# Patient Record
Sex: MALE | Race: WHITE
[De-identification: names, ages, dates, MRNs, and addresses within clinical notes are randomized per-mention and may not be internally consistent; named-entity substitution may affect disease eponyms.]

---

## 2017-04-11 ENCOUNTER — SURGERY (OUTPATIENT)
Age: 57
End: 2017-04-11
Payer: COMMERCIAL

## 2017-04-11 ENCOUNTER — HOSPITAL ENCOUNTER (INPATIENT)
Facility: CLINIC | Age: 57
LOS: 3 days | Discharge: HOME | DRG: 339 | End: 2017-04-15
Attending: FAMILY MEDICINE | Admitting: SURGERY
Payer: COMMERCIAL

## 2017-04-11 ENCOUNTER — APPOINTMENT (OUTPATIENT)
Dept: CT IMAGING | Facility: CLINIC | Age: 57
DRG: 339 | End: 2017-04-11
Attending: FAMILY MEDICINE
Payer: COMMERCIAL

## 2017-04-11 ENCOUNTER — ANESTHESIA EVENT (OUTPATIENT)
Dept: SURGERY | Facility: CLINIC | Age: 57
DRG: 339 | End: 2017-04-11
Payer: COMMERCIAL

## 2017-04-11 ENCOUNTER — ANESTHESIA (OUTPATIENT)
Dept: SURGERY | Facility: CLINIC | Age: 57
DRG: 339 | End: 2017-04-11
Payer: COMMERCIAL

## 2017-04-11 DIAGNOSIS — L03.818 CELLULITIS OF OTHER SPECIFIED SITE: ICD-10-CM

## 2017-04-11 DIAGNOSIS — K35.30 ACUTE APPENDICITIS WITH LOCALIZED PERITONITIS: ICD-10-CM

## 2017-04-11 DIAGNOSIS — G89.18 ACUTE POST-OPERATIVE PAIN: Primary | ICD-10-CM

## 2017-04-11 LAB
ALBUMIN SERPL-MCNC: 4.1 G/DL (ref 3.4–5)
ALP SERPL-CCNC: 84 U/L (ref 40–150)
ALT SERPL W P-5'-P-CCNC: 31 U/L (ref 0–70)
AMYLASE SERPL-CCNC: 35 U/L (ref 30–110)
ANION GAP SERPL CALCULATED.3IONS-SCNC: 11 MMOL/L (ref 3–14)
AST SERPL W P-5'-P-CCNC: 8 U/L (ref 0–45)
BASOPHILS # BLD AUTO: 0 10E9/L (ref 0–0.2)
BASOPHILS NFR BLD AUTO: 0.2 %
BILIRUB SERPL-MCNC: 1.2 MG/DL (ref 0.2–1.3)
BUN SERPL-MCNC: 16 MG/DL (ref 7–30)
CALCIUM SERPL-MCNC: 9.1 MG/DL (ref 8.5–10.1)
CHLORIDE SERPL-SCNC: 100 MMOL/L (ref 94–109)
CO2 SERPL-SCNC: 25 MMOL/L (ref 20–32)
CREAT SERPL-MCNC: 0.77 MG/DL (ref 0.66–1.25)
DIFFERENTIAL METHOD BLD: ABNORMAL
EOSINOPHIL # BLD AUTO: 0 10E9/L (ref 0–0.7)
EOSINOPHIL NFR BLD AUTO: 0.1 %
ERYTHROCYTE [DISTWIDTH] IN BLOOD BY AUTOMATED COUNT: 12.6 % (ref 10–15)
GFR SERPL CREATININE-BSD FRML MDRD: ABNORMAL ML/MIN/1.7M2
GLUCOSE SERPL-MCNC: 133 MG/DL (ref 70–99)
HCT VFR BLD AUTO: 44.4 % (ref 40–53)
HGB BLD-MCNC: 15.5 G/DL (ref 13.3–17.7)
IMM GRANULOCYTES # BLD: 0.1 10E9/L (ref 0–0.4)
IMM GRANULOCYTES NFR BLD: 0.3 %
LIPASE SERPL-CCNC: 97 U/L (ref 73–393)
LYMPHOCYTES # BLD AUTO: 1.1 10E9/L (ref 0.8–5.3)
LYMPHOCYTES NFR BLD AUTO: 6.2 %
MCH RBC QN AUTO: 30.6 PG (ref 26.5–33)
MCHC RBC AUTO-ENTMCNC: 34.9 G/DL (ref 31.5–36.5)
MCV RBC AUTO: 88 FL (ref 78–100)
MONOCYTES # BLD AUTO: 1.6 10E9/L (ref 0–1.3)
MONOCYTES NFR BLD AUTO: 8.9 %
NEUTROPHILS # BLD AUTO: 14.8 10E9/L (ref 1.6–8.3)
NEUTROPHILS NFR BLD AUTO: 84.3 %
PLATELET # BLD AUTO: 360 10E9/L (ref 150–450)
POTASSIUM SERPL-SCNC: 3.9 MMOL/L (ref 3.4–5.3)
PROT SERPL-MCNC: 7.8 G/DL (ref 6.8–8.8)
RBC # BLD AUTO: 5.06 10E12/L (ref 4.4–5.9)
SODIUM SERPL-SCNC: 136 MMOL/L (ref 133–144)
WBC # BLD AUTO: 17.5 10E9/L (ref 4–11)

## 2017-04-11 PROCEDURE — 25000125 ZZHC RX 250: Performed by: SURGERY

## 2017-04-11 PROCEDURE — 36000052 ZZH SURGERY LEVEL 2 EA 15 ADDTL MIN: Performed by: SURGERY

## 2017-04-11 PROCEDURE — 25800025 ZZH RX 258: Performed by: NURSE ANESTHETIST, CERTIFIED REGISTERED

## 2017-04-11 PROCEDURE — 25000128 H RX IP 250 OP 636: Performed by: EMERGENCY MEDICINE

## 2017-04-11 PROCEDURE — 36000050 ZZH SURGERY LEVEL 2 1ST 30 MIN: Performed by: SURGERY

## 2017-04-11 PROCEDURE — 96375 TX/PRO/DX INJ NEW DRUG ADDON: CPT

## 2017-04-11 PROCEDURE — 0DJD4ZZ INSPECTION OF LOWER INTESTINAL TRACT, PERCUTANEOUS ENDOSCOPIC APPROACH: ICD-10-PCS | Performed by: SURGERY

## 2017-04-11 PROCEDURE — 99285 EMERGENCY DEPT VISIT HI MDM: CPT | Mod: 25

## 2017-04-11 PROCEDURE — 25000128 H RX IP 250 OP 636: Performed by: FAMILY MEDICINE

## 2017-04-11 PROCEDURE — 96361 HYDRATE IV INFUSION ADD-ON: CPT

## 2017-04-11 PROCEDURE — 83690 ASSAY OF LIPASE: CPT | Performed by: EMERGENCY MEDICINE

## 2017-04-11 PROCEDURE — 25500064 ZZH RX 255 OP 636: Performed by: FAMILY MEDICINE

## 2017-04-11 PROCEDURE — 88304 TISSUE EXAM BY PATHOLOGIST: CPT | Mod: 26 | Performed by: SURGERY

## 2017-04-11 PROCEDURE — 99285 EMERGENCY DEPT VISIT HI MDM: CPT | Performed by: FAMILY MEDICINE

## 2017-04-11 PROCEDURE — 80053 COMPREHEN METABOLIC PANEL: CPT | Performed by: EMERGENCY MEDICINE

## 2017-04-11 PROCEDURE — 37000009 ZZH ANESTHESIA TECHNICAL FEE, EACH ADDTL 15 MIN: Performed by: SURGERY

## 2017-04-11 PROCEDURE — 74177 CT ABD & PELVIS W/CONTRAST: CPT

## 2017-04-11 PROCEDURE — 96374 THER/PROPH/DIAG INJ IV PUSH: CPT

## 2017-04-11 PROCEDURE — 27210794 ZZH OR GENERAL SUPPLY STERILE: Performed by: SURGERY

## 2017-04-11 PROCEDURE — 37000008 ZZH ANESTHESIA TECHNICAL FEE, 1ST 30 MIN: Performed by: SURGERY

## 2017-04-11 PROCEDURE — 25000564 ZZH DESFLURANE, EA 15 MIN: Performed by: SURGERY

## 2017-04-11 PROCEDURE — 99221 1ST HOSP IP/OBS SF/LOW 40: CPT | Mod: 57 | Performed by: SURGERY

## 2017-04-11 PROCEDURE — 44950 APPENDECTOMY: CPT | Performed by: SURGERY

## 2017-04-11 PROCEDURE — 44950 APPENDECTOMY: CPT | Mod: AS | Performed by: PHYSICIAN ASSISTANT

## 2017-04-11 PROCEDURE — 27110028 ZZH OR GENERAL SUPPLY NON-STERILE: Performed by: SURGERY

## 2017-04-11 PROCEDURE — 82150 ASSAY OF AMYLASE: CPT | Performed by: EMERGENCY MEDICINE

## 2017-04-11 PROCEDURE — 25000125 ZZHC RX 250: Performed by: FAMILY MEDICINE

## 2017-04-11 PROCEDURE — 71000014 ZZH RECOVERY PHASE 1 LEVEL 2 FIRST HR: Performed by: SURGERY

## 2017-04-11 PROCEDURE — 85025 COMPLETE CBC W/AUTO DIFF WBC: CPT | Performed by: EMERGENCY MEDICINE

## 2017-04-11 PROCEDURE — 0DTJ0ZZ RESECTION OF APPENDIX, OPEN APPROACH: ICD-10-PCS | Performed by: SURGERY

## 2017-04-11 PROCEDURE — 25000125 ZZHC RX 250: Performed by: NURSE ANESTHETIST, CERTIFIED REGISTERED

## 2017-04-11 PROCEDURE — 25000128 H RX IP 250 OP 636: Performed by: NURSE ANESTHETIST, CERTIFIED REGISTERED

## 2017-04-11 PROCEDURE — 88304 TISSUE EXAM BY PATHOLOGIST: CPT | Performed by: SURGERY

## 2017-04-11 RX ORDER — GLYCOPYRROLATE 0.2 MG/ML
INJECTION, SOLUTION INTRAMUSCULAR; INTRAVENOUS PRN
Status: DISCONTINUED | OUTPATIENT
Start: 2017-04-11 | End: 2017-04-11

## 2017-04-11 RX ORDER — KETOROLAC TROMETHAMINE 30 MG/ML
30 INJECTION, SOLUTION INTRAMUSCULAR; INTRAVENOUS ONCE
Status: COMPLETED | OUTPATIENT
Start: 2017-04-11 | End: 2017-04-11

## 2017-04-11 RX ORDER — MEPERIDINE HYDROCHLORIDE 50 MG/ML
INJECTION INTRAMUSCULAR; INTRAVENOUS; SUBCUTANEOUS PRN
Status: DISCONTINUED | OUTPATIENT
Start: 2017-04-11 | End: 2017-04-11

## 2017-04-11 RX ORDER — FENTANYL CITRATE 50 UG/ML
25-50 INJECTION, SOLUTION INTRAMUSCULAR; INTRAVENOUS
Status: DISCONTINUED | OUTPATIENT
Start: 2017-04-11 | End: 2017-04-12 | Stop reason: HOSPADM

## 2017-04-11 RX ORDER — PROPOFOL 10 MG/ML
INJECTION, EMULSION INTRAVENOUS PRN
Status: DISCONTINUED | OUTPATIENT
Start: 2017-04-11 | End: 2017-04-11

## 2017-04-11 RX ORDER — AMPICILLIN AND SULBACTAM 2; 1 G/1; G/1
3 INJECTION, POWDER, FOR SOLUTION INTRAMUSCULAR; INTRAVENOUS
Status: COMPLETED | OUTPATIENT
Start: 2017-04-11 | End: 2017-04-11

## 2017-04-11 RX ORDER — CEFAZOLIN SODIUM 2 G/100ML
2 INJECTION, SOLUTION INTRAVENOUS
Status: DISCONTINUED | OUTPATIENT
Start: 2017-04-11 | End: 2017-04-11 | Stop reason: CLARIF

## 2017-04-11 RX ORDER — SODIUM CHLORIDE, SODIUM LACTATE, POTASSIUM CHLORIDE, CALCIUM CHLORIDE 600; 310; 30; 20 MG/100ML; MG/100ML; MG/100ML; MG/100ML
INJECTION, SOLUTION INTRAVENOUS CONTINUOUS
Status: DISCONTINUED | OUTPATIENT
Start: 2017-04-11 | End: 2017-04-12 | Stop reason: HOSPADM

## 2017-04-11 RX ORDER — CEFAZOLIN SODIUM 1 G/3ML
1 INJECTION, POWDER, FOR SOLUTION INTRAMUSCULAR; INTRAVENOUS SEE ADMIN INSTRUCTIONS
Status: DISCONTINUED | OUTPATIENT
Start: 2017-04-11 | End: 2017-04-11 | Stop reason: CLARIF

## 2017-04-11 RX ORDER — SODIUM CHLORIDE, SODIUM LACTATE, POTASSIUM CHLORIDE, CALCIUM CHLORIDE 600; 310; 30; 20 MG/100ML; MG/100ML; MG/100ML; MG/100ML
INJECTION, SOLUTION INTRAVENOUS CONTINUOUS PRN
Status: DISCONTINUED | OUTPATIENT
Start: 2017-04-11 | End: 2017-04-11

## 2017-04-11 RX ORDER — NALOXONE HYDROCHLORIDE 0.4 MG/ML
.1-.4 INJECTION, SOLUTION INTRAMUSCULAR; INTRAVENOUS; SUBCUTANEOUS
Status: DISCONTINUED | OUTPATIENT
Start: 2017-04-11 | End: 2017-04-12 | Stop reason: HOSPADM

## 2017-04-11 RX ORDER — LIDOCAINE HYDROCHLORIDE 10 MG/ML
INJECTION, SOLUTION EPIDURAL; INFILTRATION; INTRACAUDAL; PERINEURAL PRN
Status: DISCONTINUED | OUTPATIENT
Start: 2017-04-11 | End: 2017-04-11

## 2017-04-11 RX ORDER — DEXAMETHASONE SODIUM PHOSPHATE 4 MG/ML
INJECTION, SOLUTION INTRA-ARTICULAR; INTRALESIONAL; INTRAMUSCULAR; INTRAVENOUS; SOFT TISSUE PRN
Status: DISCONTINUED | OUTPATIENT
Start: 2017-04-11 | End: 2017-04-11

## 2017-04-11 RX ORDER — AMINOCAPROIC ACID 500 MG/1
500 TABLET ORAL DAILY
COMMUNITY

## 2017-04-11 RX ORDER — LORATADINE 10 MG/1
10 TABLET ORAL DAILY
COMMUNITY

## 2017-04-11 RX ORDER — ALPRAZOLAM 0.5 MG
0.5 TABLET ORAL EVERY 6 HOURS PRN
COMMUNITY
Start: 2017-01-10

## 2017-04-11 RX ORDER — FLUTICASONE PROPIONATE 50 MCG
1 SPRAY, SUSPENSION (ML) NASAL 2 TIMES DAILY
COMMUNITY

## 2017-04-11 RX ORDER — FENTANYL CITRATE 50 UG/ML
INJECTION, SOLUTION INTRAMUSCULAR; INTRAVENOUS PRN
Status: DISCONTINUED | OUTPATIENT
Start: 2017-04-11 | End: 2017-04-11

## 2017-04-11 RX ORDER — ONDANSETRON 2 MG/ML
INJECTION INTRAMUSCULAR; INTRAVENOUS PRN
Status: DISCONTINUED | OUTPATIENT
Start: 2017-04-11 | End: 2017-04-11

## 2017-04-11 RX ORDER — ONDANSETRON 4 MG/1
4 TABLET, ORALLY DISINTEGRATING ORAL EVERY 30 MIN PRN
Status: DISCONTINUED | OUTPATIENT
Start: 2017-04-11 | End: 2017-04-12 | Stop reason: HOSPADM

## 2017-04-11 RX ORDER — ALBUTEROL SULFATE 0.83 MG/ML
2.5 SOLUTION RESPIRATORY (INHALATION) EVERY 4 HOURS PRN
Status: DISCONTINUED | OUTPATIENT
Start: 2017-04-11 | End: 2017-04-12 | Stop reason: HOSPADM

## 2017-04-11 RX ORDER — AMPICILLIN, SULBACTAM 250; 125 MG/ML; MG/ML
3 INJECTION, POWDER, FOR SOLUTION INTRAMUSCULAR; INTRAVENOUS ONCE
Status: DISCONTINUED | OUTPATIENT
Start: 2017-04-11 | End: 2017-04-11 | Stop reason: CLARIF

## 2017-04-11 RX ORDER — ONDANSETRON 2 MG/ML
4 INJECTION INTRAMUSCULAR; INTRAVENOUS EVERY 30 MIN PRN
Status: DISCONTINUED | OUTPATIENT
Start: 2017-04-11 | End: 2017-04-12

## 2017-04-11 RX ORDER — ONDANSETRON 2 MG/ML
4 INJECTION INTRAMUSCULAR; INTRAVENOUS EVERY 30 MIN PRN
Status: DISCONTINUED | OUTPATIENT
Start: 2017-04-11 | End: 2017-04-12 | Stop reason: HOSPADM

## 2017-04-11 RX ORDER — IOPAMIDOL 755 MG/ML
100 INJECTION, SOLUTION INTRAVASCULAR ONCE
Status: COMPLETED | OUTPATIENT
Start: 2017-04-11 | End: 2017-04-11

## 2017-04-11 RX ORDER — NEOSTIGMINE METHYLSULFATE 1 MG/ML
VIAL (ML) INJECTION PRN
Status: DISCONTINUED | OUTPATIENT
Start: 2017-04-11 | End: 2017-04-11

## 2017-04-11 RX ORDER — HYDROMORPHONE HYDROCHLORIDE 1 MG/ML
.3-.5 INJECTION, SOLUTION INTRAMUSCULAR; INTRAVENOUS; SUBCUTANEOUS EVERY 10 MIN PRN
Status: DISCONTINUED | OUTPATIENT
Start: 2017-04-11 | End: 2017-04-12 | Stop reason: HOSPADM

## 2017-04-11 RX ORDER — MEPERIDINE HYDROCHLORIDE 25 MG/ML
12.5 INJECTION INTRAMUSCULAR; INTRAVENOUS; SUBCUTANEOUS
Status: DISCONTINUED | OUTPATIENT
Start: 2017-04-11 | End: 2017-04-12 | Stop reason: HOSPADM

## 2017-04-11 RX ADMIN — SODIUM CHLORIDE, POTASSIUM CHLORIDE, SODIUM LACTATE AND CALCIUM CHLORIDE: 600; 310; 30; 20 INJECTION, SOLUTION INTRAVENOUS at 22:38

## 2017-04-11 RX ADMIN — SODIUM CHLORIDE 66 ML: 9 INJECTION, SOLUTION INTRAVENOUS at 20:12

## 2017-04-11 RX ADMIN — IOPAMIDOL 100 ML: 755 INJECTION, SOLUTION INTRAVENOUS at 20:11

## 2017-04-11 RX ADMIN — ROCURONIUM BROMIDE 20 MG: 10 INJECTION INTRAVENOUS at 23:00

## 2017-04-11 RX ADMIN — ROCURONIUM BROMIDE 30 MG: 10 INJECTION INTRAVENOUS at 22:02

## 2017-04-11 RX ADMIN — FENTANYL CITRATE 150 MCG: 50 INJECTION, SOLUTION INTRAMUSCULAR; INTRAVENOUS at 22:44

## 2017-04-11 RX ADMIN — AMPICILLIN SODIUM AND SULBACTAM SODIUM 3 G: 2; 1 INJECTION, POWDER, FOR SOLUTION INTRAMUSCULAR; INTRAVENOUS at 22:05

## 2017-04-11 RX ADMIN — LIDOCAINE HYDROCHLORIDE 50 MG: 10 INJECTION, SOLUTION EPIDURAL; INFILTRATION; INTRACAUDAL; PERINEURAL at 22:02

## 2017-04-11 RX ADMIN — SODIUM CHLORIDE 1000 ML: 9 INJECTION, SOLUTION INTRAVENOUS at 19:33

## 2017-04-11 RX ADMIN — ONDANSETRON 4 MG: 2 INJECTION INTRAMUSCULAR; INTRAVENOUS at 22:31

## 2017-04-11 RX ADMIN — FENTANYL CITRATE 100 MCG: 50 INJECTION, SOLUTION INTRAMUSCULAR; INTRAVENOUS at 21:56

## 2017-04-11 RX ADMIN — FENTANYL CITRATE 150 MCG: 50 INJECTION, SOLUTION INTRAMUSCULAR; INTRAVENOUS at 22:02

## 2017-04-11 RX ADMIN — ROCURONIUM BROMIDE 20 MG: 10 INJECTION INTRAVENOUS at 22:28

## 2017-04-11 RX ADMIN — MIDAZOLAM HYDROCHLORIDE 2 MG: 1 INJECTION, SOLUTION INTRAMUSCULAR; INTRAVENOUS at 21:55

## 2017-04-11 RX ADMIN — ONDANSETRON 4 MG: 2 INJECTION INTRAMUSCULAR; INTRAVENOUS at 19:19

## 2017-04-11 RX ADMIN — KETOROLAC TROMETHAMINE 30 MG: 30 INJECTION, SOLUTION INTRAMUSCULAR at 19:33

## 2017-04-11 RX ADMIN — FENTANYL CITRATE 100 MCG: 50 INJECTION, SOLUTION INTRAMUSCULAR; INTRAVENOUS at 22:50

## 2017-04-11 RX ADMIN — PROPOFOL 200 MG: 10 INJECTION, EMULSION INTRAVENOUS at 22:02

## 2017-04-11 RX ADMIN — ROCURONIUM BROMIDE 10 MG: 10 INJECTION INTRAVENOUS at 22:01

## 2017-04-11 RX ADMIN — GLYCOPYRROLATE 0.2 MG: 0.2 INJECTION, SOLUTION INTRAMUSCULAR; INTRAVENOUS at 22:02

## 2017-04-11 RX ADMIN — SODIUM CHLORIDE, POTASSIUM CHLORIDE, SODIUM LACTATE AND CALCIUM CHLORIDE: 600; 310; 30; 20 INJECTION, SOLUTION INTRAVENOUS at 21:55

## 2017-04-11 RX ADMIN — Medication 3 MG: at 23:33

## 2017-04-11 RX ADMIN — DEXAMETHASONE SODIUM PHOSPHATE 4 MG: 4 INJECTION, SOLUTION INTRA-ARTICULAR; INTRALESIONAL; INTRAMUSCULAR; INTRAVENOUS; SOFT TISSUE at 22:31

## 2017-04-11 RX ADMIN — MEPERIDINE HYDROCHLORIDE 50 MG: 50 INJECTION, SOLUTION INTRAMUSCULAR; INTRAVENOUS; SUBCUTANEOUS at 23:07

## 2017-04-11 RX ADMIN — GLYCOPYRROLATE 0.6 MG: 0.2 INJECTION, SOLUTION INTRAMUSCULAR; INTRAVENOUS at 23:33

## 2017-04-11 NOTE — IP AVS SNAPSHOT
[facility]    [address] [address]    Phone:  [phone]    Fax:  [phone]                                       After Visit Summary   4/11/2017    Ugo Mancia    MRN: [id number]           After Visit Summary Signature Page     I have received my discharge instructions, and my questions have been answered. I have discussed any challenges I see with this plan with the nurse or doctor.    ..........................................................................................................................................  Patient/Patient Representative Signature      ..........................................................................................................................................  Patient Representative Print Name and Relationship to Patient    ..................................................               ................................................  Date                                            Time    ..........................................................................................................................................  Reviewed by Signature/Title    ...................................................              ..............................................  Date                                                            Time

## 2017-04-11 NOTE — IP AVS SNAPSHOT
MRN:[id number]                      After Visit Summary   4/11/2017    Ugo Mancia    MRN: [id number]           Thank you!     Thank you for choosing [facility] for your care. Our goal is always to provide you with excellent care. Hearing back from our patients is one way we can continue to improve our services. Please take a few minutes to complete the written survey that you may receive in the mail after you visit with us. Thank you!        Patient Information     Date Of Birth          1960        Designated Caregiver       Most Recent Value    Caregiver    Will someone help with your care after discharge? yes    Name of designated caregiver Keisha Mancia    Phone number of caregiver [phone]    Caregiver address same as pt's      About your hospital stay     You were admitted on:  April 12, 2017 You last received care in the:  [facility]    You were discharged on:  April 15, 2017       Who to Call     For medical emergencies, please call 911.  For non-urgent questions about your medical care, please call your primary care provider or clinic, [phone]  For questions related to your surgery, please call your surgery clinic        Attending Provider     Provider Specialty    Alessandro Pratt MD Emergency Medicine    Vikram, Sajan GÓMEZ MD Surgery    Artem Dowd MD Internal Medicine    Merlin Romero MD [facility]       Primary Care Provider Office Phone # Fax #    Ian Bansal [phone]       [address]  [facility] [address]        After Care Instructions     Diet Instructions       Resume pre-procedure diet            Discharge Instructions       Patient to follow up with appointment in one weeks, Monday April 24th with Dr. Sue            No Alcohol       For 24 hours post procedure            No driving or operating machinery        until the day after procedure             No lifting        No lifting over 20 lbs and no strenuous physical activity for 6 weeks            Shower       No shower for 24 hours post procedure. May shower Postoperative Day (POD)  1            Weight bearing status - As tolerated                 Your next 10 appointments already scheduled     Apr 24, 2017 10:00 AM CDT   [facility] Visit with Sajan Sue MD   [facility]    [address] [address]   [phone]              Further instructions from your care team       Mental Health Providers  [facility] and Surrounding Area  Updated March 2016  Geriatric (Older Adult) Counseling   [facility]   Isak Harrington, PhD  Linden Sifuentes, PhD  [facility] [place]  Charles Floyd, MS  [facility]  DOROTEO Aguilar LPC  [place] [facility]  Mental Health Counseling  [facility]   [facility]   [facility]  [facility]  [facility]   Human Development Center  Isak Harrington, PhD Linden Sifuentes, PhD   Charles Floyd, MS   [facility]  of [facility]    [facility]   [facility]   DOROTEO Aguilar LPC   [facility]   [facility]  Mental Health Counseling, continued   [place] [facility]   [place] [facility]   Irish Sampson, MSW, CAPSW   [facility]   [facility]   [facility]   [facility]  Psychiatric Services   [place]  [facility]   Human Development Critical access hospital    [facility]   [place] [facility]   [facility] Mental Health   [facility]  Psychological Services   [facility]  Isak Harrington, PhD   Linden Sifuentes, PhD   Human Development Center   Charles Everett, MS Rajput    [facility]   [facility]   [facility]   [facility]  Mental Health Providers  [facility] and Surrounding Area  Updated March 2016  24 Hour Mental Health  Crisis & Hotline Numbers  [facility] [phone]  [facility] [phone]  [facility] [phone]  National Suicide Hotline [phone]  [facility] [phone]  [facility] [phone]  ***This list is not exhaustive and the information may have changed since it was  created. Contact [phone] to provide any corrections to stated information.    Please call insurance company to see who is in your network. Thanks!      Pending Results     Date and Time Order Name Status Description    4/14/2017 1454 Urine Culture Aerobic Bacterial Preliminary             Statement of Approval     Ordered          04/15/17 0802  I have reviewed and agree with all the recommendations and orders detailed in this document.  EFFECTIVE NOW     Approved and electronically signed by:  Reginald Nash MD             Admission Information     Date & Time Provider Department Dept. Phone    4/11/2017 Ezequiel Tripathi MD [facility] Surgical [phone]      Your Vitals Were     Blood Pressure Pulse Temperature Respirations Weight Pulse Oximetry    160/88 (BP Location: Left arm) 94 98.4  F (36.9  C) (Oral) 18 106.3 kg (234 lb  "5.6 oz) 96%      MyChart Information     [employer] lets you send messages to your doctor, view your test results, renew your prescriptions, schedule appointments and more. To sign up, go to [email or web address] . Click on \"Log in\" on the left side of the screen, which will take you to the Welcome page. Then click on \"Sign up Now\" on the right side of the page.     You will be asked to enter the access code listed below, as well as some personal information. Please follow the directions to create your username and password.     Your access code is: [email or web address]  Expires: 2017  8:50 AM     Your access code will  in 90 days. If you need help or a new code, please call your [place] clinic or [phone].        Care EveryWhere ID     This is your Care EveryWhere ID. This could be used by other organizations to access your [place] medical records  [id number]           Review of your medicines      START taking        Dose / Directions    amoxicillin-clavulanate 875-125 MG per tablet   Commonly known as:  AUGMENTIN   Used for:  Cellulitis of other specified site   Notes to Patient:  Start dose this afternoon with food.        Dose:  1 tablet   Take 1 tablet by mouth 2 times daily   Quantity:  20 tablet   Refills:  0       hydrOXYzine 25 MG tablet   Commonly known as:  ATARAX   Used for:  Acute post-operative pain        Dose:  25-50 mg   Take 1-2 tablets (25-50 mg) by mouth every 6 hours as needed for itching   Quantity:  30 tablet   Refills:  1       oxyCODONE-acetaminophen 5-325 MG per tablet   Commonly known as:  PERCOCET   Used for:  Acute post-operative pain        Dose:  1-2 tablet   Take 1-2 tablets by mouth every 4 hours as needed for pain (moderate to severe)   Quantity:  30 tablet   Refills:  0         CONTINUE these medicines which have NOT CHANGED        Dose / Directions    ALPRAZolam 0.5 MG tablet   Commonly known as:  XANAX        Dose:  0.5 mg   Take 0.5 mg by mouth every 6 hours as needed "   Refills:  0       aminocaproic acid 500 MG tablet   Commonly known as:  AMICAR   Notes to Patient:  Resume home dosing routine.        Dose:  500 mg   Take 500 mg by mouth daily   Refills:  0       CRESTOR PO   Notes to Patient:  Resume home dosing routine.        Dose:  10 mg   Take 10 mg by mouth daily   Refills:  0       fluticasone 50 MCG/ACT spray   Commonly known as:  FLONASE        Dose:  1 spray   Spray 1 spray into both nostrils 2 times daily   Refills:  0       INDERAL LA PO        Dose:  60 mg   Take 60 mg by mouth daily as needed for high blood pressure   Refills:  0       loratadine 10 MG tablet   Commonly known as:  CLARITIN        Dose:  10 mg   Take 10 mg by mouth daily   Refills:  0       MILK THISTLE PO   Notes to Patient:  Resume home dosing routine.        Dose:  1 capsule   Take 1 capsule by mouth daily   Refills:  0       Multi-vitamin Tabs tablet        Dose:  1 tablet   Take 1 tablet by mouth daily   Refills:  0       psyllium 0.52 G capsule        Dose:  5 capsule   Take 5 capsules by mouth every evening   Refills:  0       TRAZODONE HCL PO        Dose:  100 mg   Take 100 mg by mouth nightly as needed   Refills:  0            Where to get your medicines      These medications were sent to [facility] [place], [place] - [address]  [address] [address]     Phone:  [phone]     amoxicillin-clavulanate 875-125 MG per tablet    hydrOXYzine 25 MG tablet         Some of these will need a paper prescription and others can be bought over the counter. Ask your nurse if you have questions.     Bring a paper prescription for each of these medications     oxyCODONE-acetaminophen 5-325 MG per tablet                Protect others around you: Learn how to safely use, store and throw away your medicines at www.disposemymeds.org.             Medication List: This is a list of all your medications and when to take them. Check marks below indicate your daily home  schedule. Keep this list as a reference.      Medications           Morning Afternoon Evening Bedtime As Needed    ALPRAZolam 0.5 MG tablet   Commonly known as:  XANAX   Take 0.5 mg by mouth every 6 hours as needed   Last time this was given:  0.5 mg on 4/15/2017  7:44 AM                                   aminocaproic acid 500 MG tablet   Commonly known as:  AMICAR   Take 500 mg by mouth daily   Notes to Patient:  Resume home dosing routine.                                amoxicillin-clavulanate 875-125 MG per tablet   Commonly known as:  AUGMENTIN   Take 1 tablet by mouth 2 times daily   Notes to Patient:  Start dose this afternoon with food.                                      CRESTOR PO   Take 10 mg by mouth daily   Notes to Patient:  Resume home dosing routine.                                fluticasone 50 MCG/ACT spray   Commonly known as:  FLONASE   Spray 1 spray into both nostrils 2 times daily                                      hydrOXYzine 25 MG tablet   Commonly known as:  ATARAX   Take 1-2 tablets (25-50 mg) by mouth every 6 hours as needed for itching   Last time this was given:  25 mg on 4/15/2017  9:36 AM                                   INDERAL LA PO   Take 60 mg by mouth daily as needed for high blood pressure                                   loratadine 10 MG tablet   Commonly known as:  CLARITIN   Take 10 mg by mouth daily                                   MILK THISTLE PO   Take 1 capsule by mouth daily   Notes to Patient:  Resume home dosing routine.                                Multi-vitamin Tabs tablet   Take 1 tablet by mouth daily   Last time this was given:  1 tablet on 4/15/2017  7:44 AM                                   oxyCODONE-acetaminophen 5-325 MG per tablet   Commonly known as:  PERCOCET   Take 1-2 tablets by mouth every 4 hours as needed for pain (moderate to severe)   Last time this was given:  1 tablet on 4/15/2017 11:41 AM                                   psyllium 0.52 G  capsule   Take 5 capsules by mouth every evening                                   TRAZODONE HCL PO   Take 100 mg by mouth nightly as needed   Last time this was given:  100 mg on 4/14/2017 11:20 PM                                                More Information        Discharge Instructions for Open Appendectomy (Appendix Removal)  You have had a procedure known as an open appendectomy to remove your appendix. The appendix is a worm-shaped hollow pouch attached to the beginning of your large intestine. During an open appendectomy one incision (about 2 to 3 inches long) was made in your lower right side. A longer incision may have been used if the appendix ruptured. Here are guidelines to follow at home.  Incision Care    Wear loose-fitting clothes. This will help you be more comfortable and cause less irritation around your incision.    Shower as usual.    Gently wash around your incision with soap and water.    Don t bathe or soak in a tub or swim in a pool until your incisions are well healed.    If your incision was closed with small white strips of tape, leave them in place for 10 days.  Diet    Drink 6 to 8 glasses of water a day, unless directed otherwise.    Take a fiber-based laxative if you are constipated.    Eat a bland, low-fat diet, such as the following:    Mashed potatoes    Plain toast or bread, crackers    Soup    Plain spaghetti    Rice    Macaroni (plain or with cheese)    Cottage cheese    Pudding    Low-fat yogurt    Low-fat milk    Canned fruit (except pineapple)    Very ripe bananas  Activity    If you had general anesthesia, don t operate machinery or power tools, drink alcohol, or make major decisions for at least the first 24 hours.    Gradually increase activity level to help with your recovery. Start by doing light activities around your home once you feel able to do so.    Don t drive until you are no longer taking prescription pain medication.    Don t lift anything heavier  than 10 pounds until your doctor says it s OK.    Limit sports and strenuous activities for 1 or 2 weeks.     When to Call Your Doctor  Call your doctor right away if you have any of the following:    Swelling, oozing, increased pain, or unusual redness around the incision    Fever of 101.5 F (38.5 C) or higher    Increasing abdominal pain    Severe diarrhea, bloating, or constipation    Nausea or vomiting     4994-1710 [employer]. [address] [place] [address]. All rights reserved. This information is not intended as a substitute for professional medical care. Always follow your healthcare professional's instructions.

## 2017-04-12 ENCOUNTER — TELEPHONE (OUTPATIENT)
Dept: NURSING | Facility: CLINIC | Age: 57
End: 2017-04-12

## 2017-04-12 PROBLEM — K35.80 ACUTE APPENDICITIS: Status: ACTIVE | Noted: 2017-04-12

## 2017-04-12 LAB
ANION GAP SERPL CALCULATED.3IONS-SCNC: 10 MMOL/L (ref 3–14)
BUN SERPL-MCNC: 16 MG/DL (ref 7–30)
CALCIUM SERPL-MCNC: 8.3 MG/DL (ref 8.5–10.1)
CHLORIDE SERPL-SCNC: 103 MMOL/L (ref 94–109)
CO2 SERPL-SCNC: 23 MMOL/L (ref 20–32)
CREAT SERPL-MCNC: 0.74 MG/DL (ref 0.66–1.25)
ERYTHROCYTE [DISTWIDTH] IN BLOOD BY AUTOMATED COUNT: 12.9 % (ref 10–15)
GFR SERPL CREATININE-BSD FRML MDRD: ABNORMAL ML/MIN/1.7M2
GLUCOSE SERPL-MCNC: 179 MG/DL (ref 70–99)
HCT VFR BLD AUTO: 44.2 % (ref 40–53)
HGB BLD-MCNC: 15 G/DL (ref 13.3–17.7)
MCH RBC QN AUTO: 30.4 PG (ref 26.5–33)
MCHC RBC AUTO-ENTMCNC: 33.9 G/DL (ref 31.5–36.5)
MCV RBC AUTO: 90 FL (ref 78–100)
PLATELET # BLD AUTO: 368 10E9/L (ref 150–450)
POTASSIUM SERPL-SCNC: 4 MMOL/L (ref 3.4–5.3)
RBC # BLD AUTO: 4.94 10E12/L (ref 4.4–5.9)
SODIUM SERPL-SCNC: 136 MMOL/L (ref 133–144)
WBC # BLD AUTO: 19 10E9/L (ref 4–11)

## 2017-04-12 PROCEDURE — 25000132 ZZH RX MED GY IP 250 OP 250 PS 637: Performed by: PHYSICIAN ASSISTANT

## 2017-04-12 PROCEDURE — 12000000 ZZH R&B MED SURG/OB

## 2017-04-12 PROCEDURE — 25000132 ZZH RX MED GY IP 250 OP 250 PS 637: Performed by: SURGERY

## 2017-04-12 PROCEDURE — 80048 BASIC METABOLIC PNL TOTAL CA: CPT | Performed by: PHYSICIAN ASSISTANT

## 2017-04-12 PROCEDURE — 25000125 ZZHC RX 250: Performed by: NURSE ANESTHETIST, CERTIFIED REGISTERED

## 2017-04-12 PROCEDURE — 99232 SBSQ HOSP IP/OBS MODERATE 35: CPT | Performed by: PHYSICIAN ASSISTANT

## 2017-04-12 PROCEDURE — 25000128 H RX IP 250 OP 636: Performed by: SURGERY

## 2017-04-12 PROCEDURE — 25000125 ZZHC RX 250: Performed by: SURGERY

## 2017-04-12 PROCEDURE — 36415 COLL VENOUS BLD VENIPUNCTURE: CPT | Performed by: PHYSICIAN ASSISTANT

## 2017-04-12 PROCEDURE — 25800025 ZZH RX 258: Performed by: SURGERY

## 2017-04-12 PROCEDURE — 25800025 ZZH RX 258: Performed by: NURSE ANESTHETIST, CERTIFIED REGISTERED

## 2017-04-12 PROCEDURE — 85027 COMPLETE CBC AUTOMATED: CPT | Performed by: PHYSICIAN ASSISTANT

## 2017-04-12 PROCEDURE — 25000128 H RX IP 250 OP 636: Performed by: NURSE ANESTHETIST, CERTIFIED REGISTERED

## 2017-04-12 RX ORDER — ALPRAZOLAM 0.5 MG
0.5 TABLET ORAL EVERY 6 HOURS PRN
Status: DISCONTINUED | OUTPATIENT
Start: 2017-04-12 | End: 2017-04-15 | Stop reason: HOSPADM

## 2017-04-12 RX ORDER — ONDANSETRON 2 MG/ML
4 INJECTION INTRAMUSCULAR; INTRAVENOUS EVERY 6 HOURS PRN
Status: DISCONTINUED | OUTPATIENT
Start: 2017-04-12 | End: 2017-04-15 | Stop reason: HOSPADM

## 2017-04-12 RX ORDER — LANOLIN ALCOHOL/MO/W.PET/CERES
100 CREAM (GRAM) TOPICAL DAILY
Status: DISCONTINUED | OUTPATIENT
Start: 2017-04-12 | End: 2017-04-15 | Stop reason: HOSPADM

## 2017-04-12 RX ORDER — KETOROLAC TROMETHAMINE 30 MG/ML
30 INJECTION, SOLUTION INTRAMUSCULAR; INTRAVENOUS EVERY 6 HOURS PRN
Status: DISCONTINUED | OUTPATIENT
Start: 2017-04-11 | End: 2017-04-15 | Stop reason: HOSPADM

## 2017-04-12 RX ORDER — LIDOCAINE 40 MG/G
CREAM TOPICAL
Status: DISCONTINUED | OUTPATIENT
Start: 2017-04-12 | End: 2017-04-15 | Stop reason: HOSPADM

## 2017-04-12 RX ORDER — AMPICILLIN AND SULBACTAM 2; 1 G/1; G/1
3 INJECTION, POWDER, FOR SOLUTION INTRAMUSCULAR; INTRAVENOUS EVERY 6 HOURS
Status: DISCONTINUED | OUTPATIENT
Start: 2017-04-12 | End: 2017-04-12

## 2017-04-12 RX ORDER — ONDANSETRON 4 MG/1
4 TABLET, ORALLY DISINTEGRATING ORAL EVERY 6 HOURS PRN
Status: DISCONTINUED | OUTPATIENT
Start: 2017-04-12 | End: 2017-04-15 | Stop reason: HOSPADM

## 2017-04-12 RX ORDER — AMPICILLIN AND SULBACTAM 2; 1 G/1; G/1
3 INJECTION, POWDER, FOR SOLUTION INTRAMUSCULAR; INTRAVENOUS EVERY 6 HOURS
Status: COMPLETED | OUTPATIENT
Start: 2017-04-12 | End: 2017-04-12

## 2017-04-12 RX ORDER — HYDROMORPHONE HYDROCHLORIDE 1 MG/ML
.3-.5 INJECTION, SOLUTION INTRAMUSCULAR; INTRAVENOUS; SUBCUTANEOUS
Status: DISCONTINUED | OUTPATIENT
Start: 2017-04-12 | End: 2017-04-15 | Stop reason: HOSPADM

## 2017-04-12 RX ORDER — LORAZEPAM 1 MG/1
1-2 TABLET ORAL EVERY 30 MIN PRN
Status: DISCONTINUED | OUTPATIENT
Start: 2017-04-12 | End: 2017-04-15 | Stop reason: HOSPADM

## 2017-04-12 RX ORDER — TRAZODONE HYDROCHLORIDE 100 MG/1
100 TABLET ORAL
Status: DISCONTINUED | OUTPATIENT
Start: 2017-04-12 | End: 2017-04-15 | Stop reason: HOSPADM

## 2017-04-12 RX ORDER — DEXTROSE MONOHYDRATE, SODIUM CHLORIDE, AND POTASSIUM CHLORIDE 50; 1.49; 4.5 G/1000ML; G/1000ML; G/1000ML
INJECTION, SOLUTION INTRAVENOUS CONTINUOUS
Status: DISCONTINUED | OUTPATIENT
Start: 2017-04-12 | End: 2017-04-15 | Stop reason: HOSPADM

## 2017-04-12 RX ORDER — AMINOCAPROIC ACID 500 MG/1
500 TABLET ORAL DAILY
Status: DISCONTINUED | OUTPATIENT
Start: 2017-04-12 | End: 2017-04-12

## 2017-04-12 RX ORDER — FOLIC ACID 1 MG/1
1 TABLET ORAL DAILY
Status: DISCONTINUED | OUTPATIENT
Start: 2017-04-12 | End: 2017-04-15 | Stop reason: HOSPADM

## 2017-04-12 RX ORDER — PROPRANOLOL HCL 60 MG
60 CAPSULE, EXTENDED RELEASE 24HR ORAL DAILY PRN
Status: DISCONTINUED | OUTPATIENT
Start: 2017-04-12 | End: 2017-04-15 | Stop reason: HOSPADM

## 2017-04-12 RX ORDER — AMOXICILLIN 250 MG
1 CAPSULE ORAL AT BEDTIME
Status: DISCONTINUED | OUTPATIENT
Start: 2017-04-13 | End: 2017-04-15 | Stop reason: HOSPADM

## 2017-04-12 RX ORDER — OXYCODONE AND ACETAMINOPHEN 5; 325 MG/1; MG/1
1-2 TABLET ORAL EVERY 4 HOURS PRN
Status: DISCONTINUED | OUTPATIENT
Start: 2017-04-12 | End: 2017-04-13

## 2017-04-12 RX ORDER — HYDROMORPHONE HYDROCHLORIDE 1 MG/ML
.3-.5 INJECTION, SOLUTION INTRAMUSCULAR; INTRAVENOUS; SUBCUTANEOUS
Status: DISCONTINUED | OUTPATIENT
Start: 2017-04-12 | End: 2017-04-12

## 2017-04-12 RX ORDER — LORAZEPAM 2 MG/ML
1-2 INJECTION INTRAMUSCULAR EVERY 30 MIN PRN
Status: DISCONTINUED | OUTPATIENT
Start: 2017-04-12 | End: 2017-04-15 | Stop reason: HOSPADM

## 2017-04-12 RX ORDER — ROPIVACAINE HYDROCHLORIDE 5 MG/ML
INJECTION, SOLUTION EPIDURAL; INFILTRATION; PERINEURAL PRN
Status: DISCONTINUED | OUTPATIENT
Start: 2017-04-12 | End: 2017-04-12

## 2017-04-12 RX ORDER — NALOXONE HYDROCHLORIDE 0.4 MG/ML
.1-.4 INJECTION, SOLUTION INTRAMUSCULAR; INTRAVENOUS; SUBCUTANEOUS
Status: DISCONTINUED | OUTPATIENT
Start: 2017-04-12 | End: 2017-04-15 | Stop reason: HOSPADM

## 2017-04-12 RX ORDER — LIDOCAINE HYDROCHLORIDE 10 MG/ML
INJECTION, SOLUTION INFILTRATION; PERINEURAL PRN
Status: DISCONTINUED | OUTPATIENT
Start: 2017-04-12 | End: 2017-04-12

## 2017-04-12 RX ORDER — MULTIPLE VITAMINS W/ MINERALS TAB 9MG-400MCG
1 TAB ORAL DAILY
Status: DISCONTINUED | OUTPATIENT
Start: 2017-04-12 | End: 2017-04-15 | Stop reason: HOSPADM

## 2017-04-12 RX ADMIN — FENTANYL CITRATE 50 MCG: 50 INJECTION INTRAMUSCULAR; INTRAVENOUS at 00:24

## 2017-04-12 RX ADMIN — POTASSIUM CHLORIDE, DEXTROSE MONOHYDRATE AND SODIUM CHLORIDE: 150; 5; 450 INJECTION, SOLUTION INTRAVENOUS at 01:25

## 2017-04-12 RX ADMIN — TRAZODONE HYDROCHLORIDE 100 MG: 100 TABLET ORAL at 20:28

## 2017-04-12 RX ADMIN — KETOROLAC TROMETHAMINE 30 MG: 30 INJECTION, SOLUTION INTRAMUSCULAR at 18:38

## 2017-04-12 RX ADMIN — LIDOCAINE HYDROCHLORIDE 10 ML: 20 JELLY TOPICAL at 10:37

## 2017-04-12 RX ADMIN — POTASSIUM CHLORIDE, DEXTROSE MONOHYDRATE AND SODIUM CHLORIDE: 150; 5; 450 INJECTION, SOLUTION INTRAVENOUS at 20:22

## 2017-04-12 RX ADMIN — AMPICILLIN SODIUM AND SULBACTAM SODIUM 3 G: 2; 1 INJECTION, POWDER, FOR SOLUTION INTRAMUSCULAR; INTRAVENOUS at 18:32

## 2017-04-12 RX ADMIN — POTASSIUM CHLORIDE, DEXTROSE MONOHYDRATE AND SODIUM CHLORIDE: 150; 5; 450 INJECTION, SOLUTION INTRAVENOUS at 09:57

## 2017-04-12 RX ADMIN — ROPIVACAINE HYDROCHLORIDE 20 ML: 5 INJECTION, SOLUTION EPIDURAL; INFILTRATION; PERINEURAL at 00:38

## 2017-04-12 RX ADMIN — HYDROMORPHONE HYDROCHLORIDE 0.5 MG: 1 INJECTION, SOLUTION INTRAMUSCULAR; INTRAVENOUS; SUBCUTANEOUS at 09:46

## 2017-04-12 RX ADMIN — LIDOCAINE HYDROCHLORIDE 2 ML: 10 INJECTION, SOLUTION INFILTRATION; PERINEURAL at 00:43

## 2017-04-12 RX ADMIN — AMPICILLIN SODIUM AND SULBACTAM SODIUM 3 G: 2; 1 INJECTION, POWDER, FOR SOLUTION INTRAMUSCULAR; INTRAVENOUS at 12:18

## 2017-04-12 RX ADMIN — MULTIPLE VITAMINS W/ MINERALS TAB 1 TABLET: TAB at 09:10

## 2017-04-12 RX ADMIN — PROCHLORPERAZINE EDISYLATE 10 MG: 5 INJECTION INTRAMUSCULAR; INTRAVENOUS at 02:05

## 2017-04-12 RX ADMIN — OXYCODONE HYDROCHLORIDE AND ACETAMINOPHEN 1 TABLET: 5; 325 TABLET ORAL at 16:32

## 2017-04-12 RX ADMIN — LIDOCAINE HYDROCHLORIDE 2 ML: 10 INJECTION, SOLUTION INFILTRATION; PERINEURAL at 00:36

## 2017-04-12 RX ADMIN — KETOROLAC TROMETHAMINE 30 MG: 30 INJECTION, SOLUTION INTRAMUSCULAR at 11:26

## 2017-04-12 RX ADMIN — ALPRAZOLAM 0.5 MG: 0.5 TABLET ORAL at 05:42

## 2017-04-12 RX ADMIN — FOLIC ACID 1 MG: 1 TABLET ORAL at 09:10

## 2017-04-12 RX ADMIN — AMPICILLIN SODIUM AND SULBACTAM SODIUM 3 G: 2; 1 INJECTION, POWDER, FOR SOLUTION INTRAMUSCULAR; INTRAVENOUS at 06:47

## 2017-04-12 RX ADMIN — ROPIVACAINE HYDROCHLORIDE 20 ML: 5 INJECTION, SOLUTION EPIDURAL; INFILTRATION; PERINEURAL at 00:45

## 2017-04-12 RX ADMIN — SODIUM CHLORIDE, POTASSIUM CHLORIDE, SODIUM LACTATE AND CALCIUM CHLORIDE: 600; 310; 30; 20 INJECTION, SOLUTION INTRAVENOUS at 00:11

## 2017-04-12 RX ADMIN — ONDANSETRON 4 MG: 2 INJECTION INTRAMUSCULAR; INTRAVENOUS at 01:27

## 2017-04-12 RX ADMIN — ALPRAZOLAM 0.5 MG: 0.5 TABLET ORAL at 18:38

## 2017-04-12 RX ADMIN — ALPRAZOLAM 0.5 MG: 0.5 TABLET ORAL at 11:24

## 2017-04-12 RX ADMIN — FENTANYL CITRATE 50 MCG: 50 INJECTION INTRAMUSCULAR; INTRAVENOUS at 00:10

## 2017-04-12 RX ADMIN — Medication 100 MG: at 09:10

## 2017-04-12 RX ADMIN — HYDROMORPHONE HYDROCHLORIDE 0.5 MG: 1 INJECTION, SOLUTION INTRAMUSCULAR; INTRAVENOUS; SUBCUTANEOUS at 05:46

## 2017-04-12 NOTE — PLAN OF CARE
Problem: Goal Outcome Summary  Goal: Goal Outcome Summary  Outcome: Improving  [facility] ADMISSION NOTE     Patient admitted to room 2304 at approximately 0100 via cart from emergency room. Patient was accompanied by nurse.      Verbal SBAR report received from RN prior to patient arrival.      Patient trasferred to bed via air miguel angel. Patient alert and oriented X 3. The patient is not having any pain. 0-10 Pain Scale: 0. Admission vital signs: Blood pressure 115/78, pulse 76, temperature (P) 99.3  F (37.4  C), resp. rate 16, weight 95.3 kg (210 lb), SpO2 98 %. Patient was oriented to plan of care, call light, bed controls, tv, telephone, bathroom and visiting hours.      The following safety risks were identified during admission: fall. Yellow risk band applied: YES.      Patient c/o nausea. He was given zofran with no relief of nausea. Dr. Sue was contacted and gave order for compazine. Patient was given compazine with no further c/o nausea. Dressing with some drainage beyond markings on arrival. Has not changed since admit. Patient denies incisional pain. Declines pain med.     Veronika Gleason

## 2017-04-12 NOTE — PROGRESS NOTES
[facility] Medicine Progress Note  Date of Service: 04/12/2017    Assessment & Plan   Ugo Mancia is a 56 year old male with HLD, HTN, tremor, alcohol abuse who presented on 4/11/2017 with abdominal pain with nausea and emesis.    Acute Appendicitis s/p Appendectomy   Presented with abdominal pain with nausea and emesis. Febrile with leukocytosis. CT noted inflammatory change and enlargement of the appendix. Discussed with general surgery and appendectomy was performed.     Patient is doing well. Will need   -wound care, DVT ppx, pain management, PT/OT per surgery  -started percocet and stool softeners  -encouraged IS  -advanced diet to clear liquid    Urinary Retention   BS and cath'd for about 700mL. Likely due to anesthesia and narcotics  -continue to monitor, insert rausch per protocol if requiring multiple straight caths  -ambulate    Shoulder Pain, Right   Only with certain movements and weight bearing. Likely due to diaphragm irritation from insufflation from laparoscopic surgery. Did not have prior to surgery.  -continue to monitor    Alcohol Abuse   Drinks on weekends - not every weekend but can be 6-12 beers in one sitting. No history of withdrawal    Does not appear to be in withdrawal  -continue CIWA with symptom triggered ativan  -oral vitamins    Anxiety   Stable.  -continue PTA Xanax    HTN   Currently controlled.  -continue PTA propranolol    Essential Tremor  -continue PTA propranolol    HLD  -continue PTA crestor    Insomnia  -continue PTA trazodone    Allergic Rhinitis  -holding PTA Claritin and Flonase    DVT Prophylaxis: Lovenox  Code Status: Full Code    Lines: PIV   Rausch catheter: None    Discussion: medically doing well except for urinary retention.     Disposition: Anticipate discharge 1-2 days pending voiding.    Attestation:  I have reviewed today's vital signs, notes, medications, labs and imaging.  Total time: 35 minutes    I have discussed  patient with Dr. Romero. Assessment and plan as above.    Kathryn Machuca PA-C      Interval History   Patient's abdominal pain is tolerable, but is having left shoulder pain with weight bearing. Sleeping well and tolerating clears well. Patient is having trouble urinating and was cath'd. Passing some flatus. No BM.     Denies fever, chills, CP, SOB, nausea/vomiting.     Physical Exam   Temp:  [99.1  F (37.3  C)-101  F (38.3  C)] 99.8  F (37.7  C)  Pulse:  [] 95  Resp:  [14-18] 18  BP: (102-154)/(67-90) 134/76  SpO2:  [92 %-98 %] 94 %    Weights:   Vitals:    04/11/17 1904   Weight: 95.3 kg (210 lb)    There is no height or weight on file to calculate BMI.    Constitutional: Appears comfortable. Alert and orientated. NAD. Non-toxic  CV: Regular rate and rhythm. No murmurs noted. Radial pulses are 2+ bilaterally. No lower extremity edema  Respiratory: CTA bilaterally without crackles, wheezes or rhonchi.  GI: Soft, tender near incision sites. BS present but soft.   Skin: Warm and dry. Did not assess incisions but dressings appear clean, dry and intact.  Musculoskeletal: Moves all four extremities appropriately. No tenderness to active or passive ROM of right shoulder and patient has full ROM.    Data     Recent Labs  Lab 04/12/17 0923 04/11/17 1920   WBC 19.0* 17.5*   HGB 15.0 15.5   MCV 90 88    360    136   POTASSIUM 4.0 3.9   CHLORIDE 103 100   CO2 23 25   BUN 16 16   CR 0.74 0.77   ANIONGAP 10 11   TAMI 8.3* 9.1   * 133*   ALBUMIN  --  4.1   PROTTOTAL  --  7.8   BILITOTAL  --  1.2   ALKPHOS  --  84   ALT  --  31   AST  --  8   LIPASE  --  97         Recent Labs  Lab 04/12/17 0923 04/11/17 1920   * 133*        Unresulted Labs Ordered in the Past 30 Days of this Admission     Date and Time Order Name Status Description    4/11/2017 6612 Surgical pathology exam In process            Imaging  Recent Results (from the past 24 hour(s))   Abd/pelvis CT, IV contrast only TRAUMA  /  AAA    Narrative    CT ABDOMEN AND PELVIS WITH CONTRAST  4/11/2017 8:25 PM     HISTORY: Lower abdominal pain, fever, tenderness, diarrhea.    COMPARISON: None.    TECHNIQUE: Axial images of the abdomen and pelvis were acquired after  administration of 100 mL Isovue 370 intravenous contrast. Radiation  dose for this scan was reduced using automated exposure control,  adjustment of the mA and/or kV according to patient size, or iterative  reconstruction technique.    FINDINGS: There is inflammatory change and enlargement of the appendix  compatible with acute appendicitis. There is an appendicolith at its  base. No definite encapsulated fluid collections to suggest abscess.  The liver, bilateral kidneys and adrenal glands, pancreas, and spleen  are normal. No free air in the abdomen. Bone windows reveal no  destructive lesions. There are no abdominal or pelvic lymph nodes that  are abnormal by size criteria. The visualized lung bases are  unremarkable. There are no dilated loops of small bowel or colon.      Impression    IMPRESSION: Findings compatible with acute appendicitis. No evidence  for abscess formation or intra-abdominal free air.     TONY ORO MD      I reviewed all new labs and imaging results over the last 24 hours. I personally reviewed no images or EKG's today.    Medications     dextrose 5% and 0.45% NaCl + KCl 20 mEq/L 125 mL/hr at 04/12/17 0957       sodium chloride (PF)  3 mL Intracatheter Q8H     [START ON 4/13/2017] enoxaparin  40 mg Subcutaneous Q24H     ampicillin-sulbactam (UNASYN) IV  3 g Intravenous Q6H     thiamine  100 mg Oral Daily     folic acid  1 mg Oral Daily     multivitamin, therapeutic with minerals  1 tablet Oral Daily     I have discussed patient with Dr. Romero. Assessment and plan as above.    Kathryn Machuca PA-C

## 2017-04-12 NOTE — DISCHARGE INSTRUCTIONS
Mental Health Providers  [facility] and Surrounding Area  Updated March 2016  Geriatric (Older Adult) Counseling   [facility]   Isak Harrington, PhD Linden Sifuentes, PhD  [facility]  Charles Floyd, MS  [facility]  Catalina Gupta LPC,SAC  [facility]  Mental Health Counseling  [facility]   [facility]   [facility]  [facility]  [facility]   [facility]  Isak Harrington, PhD  Linden Sifuentes, PhD   Charles Floyd, MS [facility]  [facility]   [facility]   [facility]   Catalina Gupta LPC, SAC   [facility]   [facility]  Mental Health Counseling, continued   [facility]   [facility]   Irish Sampson, MSW, CAPSW   [facility]   [facility]   [facility]  Psychiatric Services   [facility]   [facility]    [facility]   [facility]   [facility]  Psychological Services   [facility]  Isak Harrington, PhD Linden Sifuentes, PhD   [facility]   Charles Floyd, MS [facility]    [facility]   [facility]  Counseling Services   [facility]   [facility]   [facility]  Mental Health Providers  [facility] and Surrounding Area  Updated March 2016  24 Hour Mental Health  Crisis & Hotline Numbers  [facility] [phone]  [facility] [phone]  [facility] [phone]  National Suicide Hotline [phone]  [facility] [phone]  [facility] [phone]  ***This list is not exhaustive and the information may have changed since it was  created. Contact [phone] to provide any corrections to stated information.    Please call insurance company to see who is in your network. Thanks!

## 2017-04-12 NOTE — PLAN OF CARE
Problem: Goal Outcome Summary  Goal: Goal Outcome Summary  Outcome: No Change  Patient felt uncomfortable in bed and wanted to sit up in bed. He then wanted to stand and walk. He took a few steps and wanted to go back to bed. He was assisted back to bed. Dressing was saturated. Dry dressing applied. Appears some serosang present on it now. Ice reapplied. He has not voided yet. Bladder scanned for 154 ml. Temperature 101.0. Updated Dr. Sue. Will continue unasyn as ordered.

## 2017-04-12 NOTE — ED PROVIDER NOTES
HPI  Patient is a 56-year-old male presenting with nausea, diarrhea, and abdominal pain.  The patient has a known history of heavy alcohol use.  He tells me he has not had alcohol over the past four days.  No new medications.  No recent travel.  No antibiotics.      Pt has diarrhea since 0400 hours this morning.  He's been nauseous but without vomiting.  There has been lower abdominal pain throughout.  Pain is severe at times.  No hematochezia or melena.  Fever today.  No urinary sx's.  No other sick contacts.  No obviously tainted food.  No antibiotics.    ROS: All other review of systems are negative other than that noted above.   PMH: Reviewed.  SH: Reviewed.  FH: Reviewed.      PHYSICAL  /73  Pulse 100  Temp 100.3  F (37.9  C) (Oral)  Resp 18  Wt 95.3 kg (210 lb)  SpO2 95%  General: Patient is alert and in moderate distress.  Holding an emesis bag.  Neurological: Alert.  Moving upper and lower extremities equally, bilaterally.  Head / Neck: Atraumatic.  Ears: Not done.  Eyes: Pupils are equal, round, and reactive.  Normal conjunctiva.  Nose: Midline.  No epistaxis.  Mouth / Throat: No ulcerations or lesions.  Upper pharynx is not erythematous.  Moist.  Respiratory: No respiratory distress. CTA B.  Cardiovascular: Regular rhythm.  Peripheral extremities are warm.  No edema.  No calf tenderness.  Abdomen / Pelvis: Moderate tenderness in the lower abdomen.  No distention.  Soft throughout.  Genitalia: Not done.  Musculoskeletal: No tenderness over major muscles and joints.  Skin: No evidence of rash or trauma.        PHYSICIAN  1930.  Nausea, abdominal pain, diarrhea, fever.  Likely gastroenteritis but other etiologies must be considered..  Zofran, Toradol, fluid ordered.    Labs Ordered and Resulted from Time of ED Arrival Up to the Time of Departure from the ED   CBC WITH PLATELETS DIFFERENTIAL - Abnormal; Notable for the following:        Result Value    WBC 17.5 (*)     Absolute Neutrophil 14.8 (*)      Absolute Monocytes 1.6 (*)     All other components within normal limits   COMPREHENSIVE METABOLIC PANEL - Abnormal; Notable for the following:     Glucose 133 (*)     All other components within normal limits   AMYLASE   LIPASE     2000.  White blood cell count elevated.  CT scan ordered to clarify for diverticulitis or other severe abdominal pathology.    2051.  Appendicitis diagnosed by CT scan.  I spoke with Dr. Sue who will see the patient tonight and perform the appendectomy.  He will come and see the patient in the ER.      IMPRESSION    ICD-10-CM    1. Acute appendicitis with localized peritonitis K35.3            Critical Care Time:  none   Trauma:      CMS Coding:  None         Alessandro Pratt MD  04/11/17 2054

## 2017-04-12 NOTE — ANESTHESIA PROCEDURE NOTES
Peripheral nerve/Neuraxial procedure note : TAP  Pre-Procedure  Performed by  TONY RODRIGUEZ   Location:   Procedure Times:4/12/2017 12:32 AM and 4/12/2017 12:47 AM  Pre-Anesthestic Checklist: patient identified, IV checked, risks and benefits discussed, informed consent, monitors and equipment checked, at physician/surgeon's request and post-op pain management    Timeout  Correct Patient: Yes   Correct Procedure: Yes   Correct Site: Yes   Correct Laterality: N/A   Correct Position: Yes   Site Marked: N/A   .   Procedure Documentation    Diagnosis:S/P LAPAROTOMY.    Procedure:    TAP.  Local skin infiltrated with 4 mL of 1% lidocaine.     Ultrasound used to identify targeted nerve, plexus, or vascular marker and placed a needle adjacent to it., Ultrasound was used to visualize the spread of the anesthetic in close proximity to the above stated nerve. A permanent image is entered into the patient's record.  Patient Prep;mask, sterile gloves, chlorhexidine gluconate and isopropyl alcohol, patient draped.  .  Needle: Touhy needle (20 G. 4 in). .  Spinal Needle: . . Insertion Method: Single Shot.     Assessment/Narrative  Paresthesias: No.  Injection made incrementally with aspirations every 5 mL..  The placement was negative for: blood aspirated, painful injection and site bleeding.  Bolus given via needle..   Secured via.   Complications: none.

## 2017-04-12 NOTE — ANESTHESIA CARE TRANSFER NOTE
Patient: Ugo Mancia    Procedure(s):  laparoscopic converted to open  appendectomy - Wound Class: III-Contaminated    Diagnosis: abd pain  Diagnosis Additional Information: No value filed.    Anesthesia Type:   No value filed.     Note:  Airway :Nasal Cannula  Patient transferred to:PACU        Vitals: (Last set prior to Anesthesia Care Transfer)    CRNA VITALS  4/11/2017 2317 - 4/11/2017 2356      4/11/2017             Resp Rate (observed): (!)  65                Electronically Signed By: HENNA Waller CRNA  April 11, 2017  11:56 PM

## 2017-04-12 NOTE — PROGRESS NOTES
Subjective: Patient feels surprisingly well given that he had a major open abdominal procedure last evening. His temperature did spike overnight to about 101, but it is coming down overnight. He is awake alert and reasonably comfortable. He has passed little flatus. No nausea or vomiting. He has not been able to void since surgery, and nurses are scanning his bladder at this time.        I/O last 3 completed shifts:  In: 1770.83 [P.O.:25; I.V.:1745.83]  Out: -      No current outpatient prescriptions on file.         ROUTINE IP LABS (Last four results)  BMP  Recent Labs  Lab 04/11/17 1920      POTASSIUM 3.9   CHLORIDE 100   TAMI 9.1   CO2 25   BUN 16   CR 0.77   *     CBC  Recent Labs  Lab 04/12/17  0923 04/11/17 1920   WBC 19.0* 17.5*   RBC 4.94 5.06   HGB 15.0 15.5   HCT 44.2 44.4   MCV 90 88   MCH 30.4 30.6   MCHC 33.9 34.9   RDW 12.9 12.6    360     INRNo lab results found in last 7 days.          Tcurrent: 99.1   B/P: 136/88  P: 105  R: 18          EXAM  Alert and oriented, in no apparent distress, breathing is comfortable  Respirations are unlabored  Per monitor his heart rhythm is regular in rate and rhythm  Abdomen is soft, flat, appropriately tender incision is dressed, but it is leaking a little bit of bloody fluid. It has been changed once overnight.  There is no significant cyanosis clubbing or edema noted.        A/P: CBC shows an elevated white count, but that's not unexpected given what he had done last night. I have no concerns about any intra-abdominal infection at this point. We will monitor his white count daily just to make sure comes down. This keep him on clear liquids overnight and plan to advance it tomorrow. Otherwise, routine postoperative cares. He needs to walk cough he breathes get out of bed signature etc. he will receive 3 doses of postop antibiotics and that should be enough. I anticipate discharge perhaps Friday or Saturday depending on how his pain is controlled  and what his dietary ability is at that point.    Sajan Sue MD FACS

## 2017-04-12 NOTE — BRIEF OP NOTE
[facility]   Brief Operative Note    Pre-operative diagnosis: abd pain   Post-operative diagnosis Acute retrocecal appendicitis   Procedure: Procedure(s):  laparoscopic converted to open  appendectomy - Wound Class: III-Contaminated   Surgeon(s): Surgeon(s) and Role:     * Sajan Sue MD - Primary   Estimated blood loss: * No values recorded between 4/11/2017 10:15 PM and 4/11/2017 11:47 PM *    Specimens:   ID Type Source Tests Collected by Time Destination   A :  Organ Appendix SURGICAL PATHOLOGY EXAM Sajan Sue MD 4/11/2017 11:25 PM       Findings: 1. Acute retrocecal appendicitis.  2. Non-ruptured.  3. Densely adherent to retroperitoneum and Gerota's fascia  4. Converted to open due to non-visualization and gaseous distension of colon ad small bowel  5.  cc

## 2017-04-12 NOTE — TELEPHONE ENCOUNTER
Call Type: Triage Call    Presenting Problem: Patient states that he has had unbearable  abdominal pain that started this morning around 8 am and has gotten  more severe and now notes bilateral flank pain, he rates 9-10/10 and  he states he is unable to straighen up and moving in bent over  position, no noted fever but unable to tolerate the pain.  He notes  history of ETOH but no ETOH in several days.  Triage Note:  Guideline Title: Flank Pain  Recommended Disposition: See ED Immediately  Original Inclination:  Override Disposition:  Intended Action:  Physician Contacted: No  Unbearable abdominal/pelvic pain ?  YES  New or worsening signs and symptoms that may indicate shock ? NO  Known abdominal aneurysm (swollen or ballooning aorta) AND sudden onset of  unbearable abdominal pain ? NO  High energy injury to flank area AND visible mass or swelling; bruise-like  discoloration; OR pure bloody urine ? NO  Age 50 years or older with sudden onset of deep boring or tearing pain in back OR  abdomen; may radiate to groin, hips or lower extremities ? NO  Injury to flank area, abdomen, back or genitals AND tenderness in back or side of  abdomen or blood-tinged urine ? NO  Physician Instructions:  Care Advice:

## 2017-04-12 NOTE — ANESTHESIA POSTPROCEDURE EVALUATION
Patient: Ugo Mancia    Procedure(s):  laparoscopic converted to open  appendectomy - Wound Class: III-Contaminated    Diagnosis:abd pain  Diagnosis Additional Information: No value filed.    Anesthesia Type:  No value filed.    Note:  Anesthesia Post Evaluation    Patient location during evaluation: Bedside  Patient participation: Able to fully participate in evaluation  Level of consciousness: awake and alert  Pain management: adequate  Airway patency: patent  Cardiovascular status: acceptable  Respiratory status: acceptable  Hydration status: acceptable  PONV: none     Anesthetic complications: None          Last vitals:  Vitals:    04/12/17 0024 04/12/17 0030 04/12/17 0045   BP:  114/78 115/78   Pulse: 89 78 76   Resp: 16 16 16   Temp:   37.3  C (99.1  F)   SpO2: 98% 98% 98%         Electronically Signed By: HENNA Waller CRNA  April 12, 2017  12:57 AM

## 2017-04-12 NOTE — PLAN OF CARE
Problem: Goal Outcome Summary  Goal: Goal Outcome Summary  Outcome: No Change  Patient was able to void 350 cc of urine on his own. Up in chair. Alert and steady on feet.

## 2017-04-12 NOTE — ED NOTES
Pt presents with c/o low abd pain that started this AM, radiates into back. Pt c/o nausea, tried to make self vomit to feel better. C/o diarrhea x7 times today, no blood. Temp 101.5 today, tried zantac with no relief.

## 2017-04-12 NOTE — CONSULTS
Care Transitions:  Chart reviewed and plan of care discussed in Interdisciplinary Rounds.  Per the ED report, the patient has a known history of heavy alcohol use.  CD resources placed the in Discharge AVS.  There are no discharge needs identified.  Care Management will continue to monitor through daily Interdisciplinary Rounds.  If immediate needs arise, please contact Care Management at [phone].   Tsering Malagon RN, Care Coordinator

## 2017-04-12 NOTE — CONSULTS
PCP:  Ian Bansal    Chief complaint: Acute appendicitis    History of Present Illness: Patient is a 56-year-old man who presents to the emergency room this evening with abdominal pain that started about 4:00 morning. He was morbidly generalized pain when it began and thence moved to the right lower quadrant. He described it is rather severe in nature initially, but had some pain medication in the emergency room and now feels a bit better. He hasn't been able to eat all day. He is not hungry. No vomiting. He had about 5 or 6 bowel movements q. day, were 3 would be normal.    He presented to the emergency room for evaluation. His white count was noted to be elevated at 17.5. His CT scan done reveals an acutely inflamed appendix in the right lower quadrant with an appendicolith present. This is significant surrounding inflammatory response, but no evidence of perforation. There is no free air or fluid. No other intra-abdominal pathology is noted.    Patient is known to be a fairly heavy drinker. He has not had surgery except his jaw was wired shut 40 years ago. Past medical history was reviewed. Medication list was reviewed.  Allergies Vicodin which causes itching    Histories:  History reviewed. No pertinent past medical history.    No past surgical history on file.    No family history on file.    Social History   Substance Use Topics     Smoking status: Not on file     Smokeless tobacco: Not on file     Alcohol use Not on file       Current Outpatient Prescriptions   Medication Sig Dispense Refill     loratadine (CLARITIN) 10 MG tablet Take 10 mg by mouth daily       MILK THISTLE PO Take 1 capsule by mouth daily       psyllium 0.52 G capsule Take 5 capsules by mouth every evening       aminocaproic acid (AMICAR) 500 MG tablet Take 500 mg by mouth daily       fluticasone (FLONASE) 50 MCG/ACT spray Spray 1 spray into both nostrils 2 times daily       ALPRAZolam (XANAX) 0.5 MG tablet Take 0.5 mg by mouth every 6  hours as needed       Propranolol HCl (INDERAL LA PO) Take 60 mg by mouth daily as needed for high blood pressure       Rosuvastatin Calcium (CRESTOR PO) Take 10 mg by mouth daily        TRAZODONE HCL PO Take 100 mg by mouth nightly as needed        multivitamin, therapeutic with minerals (MULTI-VITAMIN) TABS Take 1 tablet by mouth daily       [DISCONTINUED] PROPRANOLOL HCL PO Take 60 mg by mouth daily as needed          Allergies   Allergen Reactions     Vicodin [Hydrocodone-Acetaminophen] Itching       Images:  Recent Results (from the past 744 hour(s))   Abd/pelvis CT, IV contrast only TRAUMA  / AAA    Narrative    CT ABDOMEN AND PELVIS WITH CONTRAST  4/11/2017 8:25 PM     HISTORY: Lower abdominal pain, fever, tenderness, diarrhea.    COMPARISON: None.    TECHNIQUE: Axial images of the abdomen and pelvis were acquired after  administration of 100 mL Isovue 370 intravenous contrast. Radiation  dose for this scan was reduced using automated exposure control,  adjustment of the mA and/or kV according to patient size, or iterative  reconstruction technique.    FINDINGS: There is inflammatory change and enlargement of the appendix  compatible with acute appendicitis. There is an appendicolith at its  base. No definite encapsulated fluid collections to suggest abscess.  The liver, bilateral kidneys and adrenal glands, pancreas, and spleen  are normal. No free air in the abdomen. Bone windows reveal no  destructive lesions. There are no abdominal or pelvic lymph nodes that  are abnormal by size criteria. The visualized lung bases are  unremarkable. There are no dilated loops of small bowel or colon.      Impression    IMPRESSION: Findings compatible with acute appendicitis. No evidence  for abscess formation or intra-abdominal free air.     TONY ORO MD       Labs:  Results for orders placed or performed during the hospital encounter of 04/11/17   Abd/pelvis CT, IV contrast only TRAUMA  / AAA    Narrative    CT  ABDOMEN AND PELVIS WITH CONTRAST  4/11/2017 8:25 PM     HISTORY: Lower abdominal pain, fever, tenderness, diarrhea.    COMPARISON: None.    TECHNIQUE: Axial images of the abdomen and pelvis were acquired after  administration of 100 mL Isovue 370 intravenous contrast. Radiation  dose for this scan was reduced using automated exposure control,  adjustment of the mA and/or kV according to patient size, or iterative  reconstruction technique.    FINDINGS: There is inflammatory change and enlargement of the appendix  compatible with acute appendicitis. There is an appendicolith at its  base. No definite encapsulated fluid collections to suggest abscess.  The liver, bilateral kidneys and adrenal glands, pancreas, and spleen  are normal. No free air in the abdomen. Bone windows reveal no  destructive lesions. There are no abdominal or pelvic lymph nodes that  are abnormal by size criteria. The visualized lung bases are  unremarkable. There are no dilated loops of small bowel or colon.      Impression    IMPRESSION: Findings compatible with acute appendicitis. No evidence  for abscess formation or intra-abdominal free air.     TONY ORO MD   CBC with platelets differential   Result Value Ref Range    WBC 17.5 (H) 4.0 - 11.0 10e9/L    RBC Count 5.06 4.4 - 5.9 10e12/L    Hemoglobin 15.5 13.3 - 17.7 g/dL    Hematocrit 44.4 40.0 - 53.0 %    MCV 88 78 - 100 fl    MCH 30.6 26.5 - 33.0 pg    MCHC 34.9 31.5 - 36.5 g/dL    RDW 12.6 10.0 - 15.0 %    Platelet Count 360 150 - 450 10e9/L    Diff Method Automated Method     % Neutrophils 84.3 %    % Lymphocytes 6.2 %    % Monocytes 8.9 %    % Eosinophils 0.1 %    % Basophils 0.2 %    % Immature Granulocytes 0.3 %    Absolute Neutrophil 14.8 (H) 1.6 - 8.3 10e9/L    Absolute Lymphocytes 1.1 0.8 - 5.3 10e9/L    Absolute Monocytes 1.6 (H) 0.0 - 1.3 10e9/L    Absolute Eosinophils 0.0 0.0 - 0.7 10e9/L    Absolute Basophils 0.0 0.0 - 0.2 10e9/L    Abs Immature Granulocytes 0.1 0 - 0.4  10e9/L   Comprehensive metabolic panel   Result Value Ref Range    Sodium 136 133 - 144 mmol/L    Potassium 3.9 3.4 - 5.3 mmol/L    Chloride 100 94 - 109 mmol/L    Carbon Dioxide 25 20 - 32 mmol/L    Anion Gap 11 3 - 14 mmol/L    Glucose 133 (H) 70 - 99 mg/dL    Urea Nitrogen 16 7 - 30 mg/dL    Creatinine 0.77 0.66 - 1.25 mg/dL    GFR Estimate >90  Non  GFR Calc   >60 mL/min/1.7m2    GFR Estimate If Black >90   GFR Calc   >60 mL/min/1.7m2    Calcium 9.1 8.5 - 10.1 mg/dL    Bilirubin Total 1.2 0.2 - 1.3 mg/dL    Albumin 4.1 3.4 - 5.0 g/dL    Protein Total 7.8 6.8 - 8.8 g/dL    Alkaline Phosphatase 84 40 - 150 U/L    ALT 31 0 - 70 U/L    AST 8 0 - 45 U/L   Amylase   Result Value Ref Range    Amylase 35 30 - 110 U/L   Lipase   Result Value Ref Range    Lipase 97 73 - 393 U/L       ROS:  Constitutional - Denies fevers, weight loss, malaise, lethargy  Neuro - Denies tremors or seizures  Pulmon - Denies SOB, dyspnea, hemoptysis, chronic cough or use of an inhaler  CV - Denies CP, SOB, lower extremity edema, difficulty w/ stairs, has never used NTG  GI - Denies hematemesis, BRBPR, melena, chronic diarrhea or epigastric pain   - Denies hematuria, difficulty voiding, h/o STDs  Hematology - Denies blood clotting disorders, chronic anemias  Dermatology - No melanomas or skin cancers  Rheumatology - No h/o RA  Pysch - Denies depression, bipolar d/o or schizophrenia    /90  Pulse 100  Temp 100.3  F (37.9  C) (Oral)  Resp 18  Wt 95.3 kg (210 lb)  SpO2 97%    Exam:  General - Alert and Oriented X4, NAD, well nourished  HEENT - Normocephalic, atraumatic  Neck - supple, no LAD,  Lungs - respirations unlabored   CV - Heart regular in rate and rhythm without murmur   Abdomen - Soft, nondistended, mild right lower quadrant pain and tenderness, but not dramatic. He says that his markedly better since he had Toradol.  Groins - deferred  Rectal -deferred  Neuro - Full ROM, Strength 5/5 and  major muscle groups, sensation intact  Extremities - No cyanosis, clubbing or edema.      Assessment and Plan: Acute appendicitis based on history, exam, and CT scan. Plan is for urgent laparoscopic appendectomy. The procedure, risks, alternatives, and potential complications were discussed in detail. He was told about the possibility of an open procedure, although this is unlikely. I will plan to do this as an outpatient and discharge him in the middle the night. He has expressed his desire to go home even if it's 2:00 in the morning. He discussed his recovery and activity level postoperatively. We will proceed urgently.        Sajan Sue MD FACS

## 2017-04-12 NOTE — PLAN OF CARE
Problem: Goal Outcome Summary  Goal: Goal Outcome Summary  Outcome: Improving  Patient unable to void with 3 attempts. Stand at bedside and sitting on the toilet. Bladder scan was 671. Straight cath for 700 cc. Tolerated procedure well. Urojet lidocaine used prior to cath. Dressing changed. Site moist with staples in place along abdominal incision and lab sites. Afebrile. Continues on IV antibiotics. Tolerating clear liquid diet. Patient not to advance diet any further today. Ambulated in the hallway. Received IV Dilaudid once and IV Toradol. PA will speak with Dr. Sue about starting oral pain medication. Patient complains of right shoulder discomfort. Dr. Sue explained to patient that is from irritation of the diaphragm. Patient given written information about appendicitis and appendectomy. Patient well need to attempt to urinate again. If patient is unable rausch to be placed per discussion with Dr. Sue. WBC 19 this morning. Will be rechecked in the morning.

## 2017-04-13 LAB
ANION GAP SERPL CALCULATED.3IONS-SCNC: 9 MMOL/L (ref 3–14)
BUN SERPL-MCNC: 15 MG/DL (ref 7–30)
CALCIUM SERPL-MCNC: 8.5 MG/DL (ref 8.5–10.1)
CHLORIDE SERPL-SCNC: 103 MMOL/L (ref 94–109)
CO2 SERPL-SCNC: 25 MMOL/L (ref 20–32)
COPATH REPORT: NORMAL
CREAT SERPL-MCNC: 0.75 MG/DL (ref 0.66–1.25)
ERYTHROCYTE [DISTWIDTH] IN BLOOD BY AUTOMATED COUNT: 12.9 % (ref 10–15)
GFR SERPL CREATININE-BSD FRML MDRD: ABNORMAL ML/MIN/1.7M2
GLUCOSE SERPL-MCNC: 161 MG/DL (ref 70–99)
HCT VFR BLD AUTO: 40 % (ref 40–53)
HGB BLD-MCNC: 13.5 G/DL (ref 13.3–17.7)
LACTATE BLD-SCNC: 1.2 MMOL/L (ref 0.7–2.1)
MCH RBC QN AUTO: 30.7 PG (ref 26.5–33)
MCHC RBC AUTO-ENTMCNC: 33.8 G/DL (ref 31.5–36.5)
MCV RBC AUTO: 91 FL (ref 78–100)
PLATELET # BLD AUTO: 287 10E9/L (ref 150–450)
POTASSIUM SERPL-SCNC: 4 MMOL/L (ref 3.4–5.3)
RBC # BLD AUTO: 4.4 10E12/L (ref 4.4–5.9)
SODIUM SERPL-SCNC: 137 MMOL/L (ref 133–144)
WBC # BLD AUTO: 14.4 10E9/L (ref 4–11)

## 2017-04-13 PROCEDURE — 25000132 ZZH RX MED GY IP 250 OP 250 PS 637: Performed by: SURGERY

## 2017-04-13 PROCEDURE — 99231 SBSQ HOSP IP/OBS SF/LOW 25: CPT | Performed by: PHYSICIAN ASSISTANT

## 2017-04-13 PROCEDURE — 80048 BASIC METABOLIC PNL TOTAL CA: CPT | Performed by: SURGERY

## 2017-04-13 PROCEDURE — 83605 ASSAY OF LACTIC ACID: CPT | Performed by: FAMILY MEDICINE

## 2017-04-13 PROCEDURE — 25000128 H RX IP 250 OP 636: Performed by: SURGERY

## 2017-04-13 PROCEDURE — 25800025 ZZH RX 258: Performed by: SURGERY

## 2017-04-13 PROCEDURE — 25800025 ZZH RX 258: Performed by: PHYSICIAN ASSISTANT

## 2017-04-13 PROCEDURE — 36415 COLL VENOUS BLD VENIPUNCTURE: CPT | Performed by: SURGERY

## 2017-04-13 PROCEDURE — 25000132 ZZH RX MED GY IP 250 OP 250 PS 637: Performed by: PHYSICIAN ASSISTANT

## 2017-04-13 PROCEDURE — 85027 COMPLETE CBC AUTOMATED: CPT | Performed by: SURGERY

## 2017-04-13 PROCEDURE — 25000125 ZZHC RX 250: Performed by: SURGERY

## 2017-04-13 PROCEDURE — 25000128 H RX IP 250 OP 636: Performed by: PHYSICIAN ASSISTANT

## 2017-04-13 PROCEDURE — 12000000 ZZH R&B MED SURG/OB

## 2017-04-13 PROCEDURE — 36415 COLL VENOUS BLD VENIPUNCTURE: CPT | Performed by: FAMILY MEDICINE

## 2017-04-13 RX ORDER — OXYCODONE AND ACETAMINOPHEN 5; 325 MG/1; MG/1
1-2 TABLET ORAL EVERY 4 HOURS PRN
Status: DISCONTINUED | OUTPATIENT
Start: 2017-04-13 | End: 2017-04-15 | Stop reason: HOSPADM

## 2017-04-13 RX ORDER — HYDROXYZINE HYDROCHLORIDE 50 MG/1
50 TABLET, FILM COATED ORAL EVERY 6 HOURS PRN
Status: DISCONTINUED | OUTPATIENT
Start: 2017-04-13 | End: 2017-04-15 | Stop reason: HOSPADM

## 2017-04-13 RX ORDER — HYDROXYZINE HYDROCHLORIDE 25 MG/1
25 TABLET, FILM COATED ORAL EVERY 6 HOURS PRN
Status: DISCONTINUED | OUTPATIENT
Start: 2017-04-13 | End: 2017-04-15 | Stop reason: HOSPADM

## 2017-04-13 RX ORDER — ACETAMINOPHEN 325 MG/1
650 TABLET ORAL EVERY 4 HOURS PRN
Status: DISCONTINUED | OUTPATIENT
Start: 2017-04-13 | End: 2017-04-15 | Stop reason: HOSPADM

## 2017-04-13 RX ORDER — AMPICILLIN AND SULBACTAM 2; 1 G/1; G/1
3 INJECTION, POWDER, FOR SOLUTION INTRAMUSCULAR; INTRAVENOUS EVERY 6 HOURS
Status: COMPLETED | OUTPATIENT
Start: 2017-04-13 | End: 2017-04-13

## 2017-04-13 RX ORDER — ACETAMINOPHEN 650 MG/1
6.22 SUPPOSITORY RECTAL EVERY 4 HOURS PRN
Status: CANCELLED | OUTPATIENT
Start: 2017-04-13

## 2017-04-13 RX ADMIN — AMPICILLIN SODIUM AND SULBACTAM SODIUM 3 G: 2; 1 INJECTION, POWDER, FOR SOLUTION INTRAMUSCULAR; INTRAVENOUS at 20:03

## 2017-04-13 RX ADMIN — HYDROMORPHONE HYDROCHLORIDE 0.5 MG: 1 INJECTION, SOLUTION INTRAMUSCULAR; INTRAVENOUS; SUBCUTANEOUS at 22:29

## 2017-04-13 RX ADMIN — ALPRAZOLAM 0.5 MG: 0.5 TABLET ORAL at 07:57

## 2017-04-13 RX ADMIN — FOLIC ACID 1 MG: 1 TABLET ORAL at 07:49

## 2017-04-13 RX ADMIN — OXYCODONE HYDROCHLORIDE AND ACETAMINOPHEN 1 TABLET: 5; 325 TABLET ORAL at 03:36

## 2017-04-13 RX ADMIN — HYDROMORPHONE HYDROCHLORIDE 0.5 MG: 1 INJECTION, SOLUTION INTRAMUSCULAR; INTRAVENOUS; SUBCUTANEOUS at 18:04

## 2017-04-13 RX ADMIN — HYDROXYZINE HYDROCHLORIDE 25 MG: 25 TABLET ORAL at 07:58

## 2017-04-13 RX ADMIN — AMPICILLIN SODIUM AND SULBACTAM SODIUM 3 G: 2; 1 INJECTION, POWDER, FOR SOLUTION INTRAMUSCULAR; INTRAVENOUS at 08:39

## 2017-04-13 RX ADMIN — POTASSIUM CHLORIDE, DEXTROSE MONOHYDRATE AND SODIUM CHLORIDE: 150; 5; 450 INJECTION, SOLUTION INTRAVENOUS at 04:02

## 2017-04-13 RX ADMIN — HYDROMORPHONE HYDROCHLORIDE 0.5 MG: 1 INJECTION, SOLUTION INTRAMUSCULAR; INTRAVENOUS; SUBCUTANEOUS at 12:20

## 2017-04-13 RX ADMIN — Medication 100 MG: at 07:49

## 2017-04-13 RX ADMIN — ALPRAZOLAM 0.5 MG: 0.5 TABLET ORAL at 20:20

## 2017-04-13 RX ADMIN — ONDANSETRON 4 MG: 2 INJECTION INTRAMUSCULAR; INTRAVENOUS at 08:34

## 2017-04-13 RX ADMIN — KETOROLAC TROMETHAMINE 30 MG: 30 INJECTION, SOLUTION INTRAMUSCULAR at 14:00

## 2017-04-13 RX ADMIN — MULTIPLE VITAMINS W/ MINERALS TAB 1 TABLET: TAB at 07:49

## 2017-04-13 RX ADMIN — HYDROMORPHONE HYDROCHLORIDE 0.5 MG: 1 INJECTION, SOLUTION INTRAMUSCULAR; INTRAVENOUS; SUBCUTANEOUS at 04:38

## 2017-04-13 RX ADMIN — ACETAMINOPHEN 650 MG: 325 TABLET, FILM COATED ORAL at 18:33

## 2017-04-13 RX ADMIN — ALPRAZOLAM 0.5 MG: 0.5 TABLET ORAL at 00:24

## 2017-04-13 RX ADMIN — PROCHLORPERAZINE EDISYLATE 10 MG: 5 INJECTION INTRAMUSCULAR; INTRAVENOUS at 12:17

## 2017-04-13 RX ADMIN — ENOXAPARIN SODIUM 40 MG: 40 INJECTION SUBCUTANEOUS at 00:24

## 2017-04-13 RX ADMIN — HYDROXYZINE HYDROCHLORIDE 25 MG: 25 TABLET ORAL at 18:06

## 2017-04-13 RX ADMIN — SENNOSIDES AND DOCUSATE SODIUM 1 TABLET: 8.6; 5 TABLET ORAL at 00:24

## 2017-04-13 RX ADMIN — PROCHLORPERAZINE EDISYLATE 10 MG: 5 INJECTION INTRAMUSCULAR; INTRAVENOUS at 22:22

## 2017-04-13 RX ADMIN — KETOROLAC TROMETHAMINE 30 MG: 30 INJECTION, SOLUTION INTRAMUSCULAR at 07:46

## 2017-04-13 RX ADMIN — ALPRAZOLAM 0.5 MG: 0.5 TABLET ORAL at 14:08

## 2017-04-13 RX ADMIN — KETOROLAC TROMETHAMINE 30 MG: 30 INJECTION, SOLUTION INTRAMUSCULAR at 20:12

## 2017-04-13 RX ADMIN — AMPICILLIN SODIUM AND SULBACTAM SODIUM 3 G: 2; 1 INJECTION, POWDER, FOR SOLUTION INTRAMUSCULAR; INTRAVENOUS at 14:09

## 2017-04-13 RX ADMIN — POTASSIUM CHLORIDE, DEXTROSE MONOHYDRATE AND SODIUM CHLORIDE: 150; 5; 450 INJECTION, SOLUTION INTRAVENOUS at 14:04

## 2017-04-13 RX ADMIN — ONDANSETRON 4 MG: 2 INJECTION INTRAMUSCULAR; INTRAVENOUS at 17:44

## 2017-04-13 NOTE — PLAN OF CARE
Problem: Goal Outcome Summary  Goal: Goal Outcome Summary  Outcome: No Change  Sepsis alert. Lactic level 1.2. Will continue to monitor.

## 2017-04-13 NOTE — PROGRESS NOTES
[facility] Medicine Progress Note  Date of Service: 04/13/2017    Assessment & Plan   Ugo Mancia is a 56 year old male with HLD, HTN, tremor, alcohol abuse who presented on 4/11/2017 with abdominal pain with nausea and emesis.    Acute Appendicitis s/p Appendectomy   Presented with abdominal pain with nausea and emesis. Febrile with leukocytosis. CT noted inflammatory change and enlargement of the appendix. Discussed with general surgery and appendectomy was performed (Dr. Sue).     Pain is increasing with abdominal distention and nausea. Will consider KUB in AM if pain management is not improving symptoms.  -wound care, DVT ppx, pain management, PT/OT per surgery  -continue stool softeners  -encouraged IS  -advised to back down with diet  -consider KUB in AM    Urinary Retention   BS and cath'd for about 700mL. Likely due to anesthesia and narcotics   Resolved evening 4/12  -continue to monitor, insert rausch per protocol if requiring multiple straight caths    Shoulder Pain, Right   Only with certain movements and weight bearing. Likely due to diaphragm irritation from insufflation from laparoscopic surgery. Did not have prior to surgery.   Improved.  -continue to monitor    Alcohol Abuse   Drinks on weekends - not every weekend but can be 6-12 beers in one sitting. No history of withdrawal    Continue to be without signs of withdrawal  -continue CIWA with symptom triggered ativan  -oral vitamins    Anxiety   Stable.  -continue PTA Xanax    HTN   Currently controlled.  -continue PTA propranolol    Essential Tremor  -continue PTA propranolol    HLD  -continue PTA crestor    Insomnia  -continue PTA trazodone    Allergic Rhinitis  -holding PTA Claritin and Flonase    DVT Prophylaxis: Lovenox  Code Status: Full Code    Lines: PIV   Rausch catheter: None    Discussion: medically doing well except for urinary retention.     Disposition: Anticipate discharge 1-2 days pending  voiding.    Attestation:  I have reviewed today's vital signs, notes, medications, labs and imaging.  Total time: 25 minutes    I have discussed patient with Dr. Tripathi. Assessment and plan as above.    Kathryn Machuca PA-C      Interval History   Patient's abdominal pain is severe today, not having shoulder pain. Has been trying not to take pain medications. Patient is nauseated and on the verge of emesis. Patient is voiding well and passing some flatus. No BM.     Denies fever, chills, CP, SOB.    Physical Exam   Temp:  [98.7  F (37.1  C)-99.3  F (37.4  C)] 98.8  F (37.1  C)  Pulse:  [93-98] 98  Resp:  [16-18] 18  BP: (122-142)/(71-89) 140/89  SpO2:  [93 %-94 %] 94 %    Weights:   Vitals:    04/11/17 1904 04/13/17 0628   Weight: 95.3 kg (210 lb) 104.5 kg (230 lb 6.1 oz)    There is no height or weight on file to calculate BMI.    Constitutional: Appears uncomfortable. Alert and orientated. NAD. Non-toxic  CV: Regular rate and rhythm. No murmurs noted. Radial pulses are 2+ bilaterally. No lower extremity edema  Respiratory: CTA bilaterally without crackles, wheezes or rhonchi.  GI: Soft, tender near incision sites. BS present but soft. Abdomen is more distended than before. Percussion reveals tympany.   Skin: Warm and dry. Did not assess incisions but dressings have serosanguinous drainage.  Musculoskeletal: Moves all four extremities appropriately.     Data     Recent Labs  Lab 04/13/17  0650 04/12/17  0923 04/11/17  1920   WBC 14.4* 19.0* 17.5*   HGB 13.5 15.0 15.5   MCV 91 90 88    368 360    136 136   POTASSIUM 4.0 4.0 3.9   CHLORIDE 103 103 100   CO2 25 23 25   BUN 15 16 16   CR 0.75 0.74 0.77   ANIONGAP 9 10 11   TAMI 8.5 8.3* 9.1   * 179* 133*   ALBUMIN  --   --  4.1   PROTTOTAL  --   --  7.8   BILITOTAL  --   --  1.2   ALKPHOS  --   --  84   ALT  --   --  31   AST  --   --  8   LIPASE  --   --  97       Recent Labs  Lab 04/13/17  0650 04/12/17  0923 04/11/17  1920   * 179* 133*         Unresulted Labs Ordered in the Past 30 Days of this Admission     No orders found from 2/10/2017 to 4/12/2017.         Imaging  No results found for this or any previous visit (from the past 24 hour(s)).   I reviewed all new labs and imaging results over the last 24 hours. I personally reviewed no images or EKG's today.    Medications     dextrose 5% and 0.45% NaCl + KCl 20 mEq/L 125 mL/hr at 04/13/17 0600       ampicillin-sulbactam (UNASYN) IV  3 g Intravenous Q6H     sodium chloride (PF)  3 mL Intracatheter Q8H     enoxaparin  40 mg Subcutaneous Q24H     thiamine  100 mg Oral Daily     folic acid  1 mg Oral Daily     multivitamin, therapeutic with minerals  1 tablet Oral Daily     senna-docusate  1 tablet Oral At Bedtime     I have discussed patient with Dr. Tripathi. Assessment and plan as above.    Kathryn Machuca PA-C

## 2017-04-13 NOTE — PLAN OF CARE
Problem: Goal Outcome Summary  Goal: Goal Outcome Summary  Outcome: No Change  Patient received IV Toradol and Atarax this morning for abdominal pain. Pain less and was able to ambulate in the hallway. Abdomin round and distended. Passing some flatus per patient. Received Zofran for nausea with relief. Po only water and sips of ginger ale. This afternoon patient with increased abdominal pain and nausea. Received dose of Dilaudid and Compazine. Abdomin appears to be more distended. Messaged sent to Dr. Lewis. Patient currently in bed with PCD's on.

## 2017-04-13 NOTE — PLAN OF CARE
Problem: Individualization  Goal: Patient Preferences  Patient A/O x4. CIWA score=2; anxiety regarding pain and able to get to bathroom on time to urinate; prn anxiety med given.  Urinating without difficulty x4 this shift. C/O pain to abdomen; prn pain meds given. Dressing with some serosanguinous drainage; otherwise intact. Patient hoping to go home today.    Temp: 99.3  F (37.4  C) Temp src: Oral BP: 122/80 Pulse: 94   Resp: 18 SpO2: 94 % O2 Device: None (Room air) Oxygen Delivery: 2 LPM      Continue to monitor and implement poc.

## 2017-04-13 NOTE — PROGRESS NOTES
POD#2        I/O last 3 completed shifts:  In: 3604 [P.O.:940; I.V.:2664]  Out: 2175 [Urine:2175]    Patient Vitals for the past 24 hrs:   BP Temp Temp src Pulse Resp SpO2 Weight   04/13/17 0628 - - - - - - 104.5 kg (230 lb 6.1 oz)   04/13/17 0000 122/80 99.3  F (37.4  C) Oral 94 18 94 % -   04/12/17 1542 130/71 98.7  F (37.1  C) Oral 93 16 93 % -   04/12/17 1127 - - - - - 94 % -   04/12/17 1114 134/76 99.8  F (37.7  C) Oral 95 18 95 % -   04/12/17 0947 - - - - - 93 % -   04/12/17 0734 - - - - - 94 % -   04/12/17 0731 136/88 99.1  F (37.3  C) Oral 105 18 92 % -     ROS  CV - No CP or SOB  Resp - No SOB or dyspnea  GI - No nausea or vomiting   - No difficulty with urination    Exam:  AXO3 NAD  Neuro - Strength 5/5 all major groups, sensation intact, PERRL  Lungs - CTA  CV - RRR  Abd - Soft, non-distended, non-tender, +BS  Extr - No edema    A/P: s/p open appy. Still spiking fevers.  Continue IV abx.  Advance diet.    Scott Peralta MD

## 2017-04-13 NOTE — PLAN OF CARE
Problem: Goal Outcome Summary  Goal: Goal Outcome Summary  Outcome: No Change  Patient received Xanax per his request. Received dose of IV Toradol. Spoke with Dr. Peralta on the phone concerning distended abdomin and nausea/pain. Plan is to continue with clears for now. Patient to take oral intake depending on how he feels/desires.  Continue to ambulated in hallway. Continue IV antibiotics and recheck cbc in the morning. WBC 14.4 which is down from 19 yesterday. Patient also having occasional hiccups. Also complains of having difficulty swallowing at times. Patient able to swallow pill and water. Does better with hob more elevated. Using IS but only pulls to 500-750.

## 2017-04-14 ENCOUNTER — APPOINTMENT (OUTPATIENT)
Dept: GENERAL RADIOLOGY | Facility: CLINIC | Age: 57
DRG: 339 | End: 2017-04-14
Attending: PHYSICIAN ASSISTANT
Payer: COMMERCIAL

## 2017-04-14 LAB
ALBUMIN UR-MCNC: 30 MG/DL
APPEARANCE UR: CLEAR
BACTERIA #/AREA URNS HPF: ABNORMAL /HPF
BILIRUB UR QL STRIP: NEGATIVE
COLOR UR AUTO: YELLOW
ERYTHROCYTE [DISTWIDTH] IN BLOOD BY AUTOMATED COUNT: 12.9 % (ref 10–15)
GLUCOSE UR STRIP-MCNC: NEGATIVE MG/DL
HCT VFR BLD AUTO: 39.5 % (ref 40–53)
HGB BLD-MCNC: 13.1 G/DL (ref 13.3–17.7)
HGB UR QL STRIP: ABNORMAL
KETONES UR STRIP-MCNC: NEGATIVE MG/DL
LEUKOCYTE ESTERASE UR QL STRIP: NEGATIVE
MCH RBC QN AUTO: 30 PG (ref 26.5–33)
MCHC RBC AUTO-ENTMCNC: 33.2 G/DL (ref 31.5–36.5)
MCV RBC AUTO: 91 FL (ref 78–100)
NITRATE UR QL: NEGATIVE
PH UR STRIP: 6.5 PH (ref 5–7)
PLATELET # BLD AUTO: 300 10E9/L (ref 150–450)
RBC # BLD AUTO: 4.36 10E12/L (ref 4.4–5.9)
RBC #/AREA URNS AUTO: 0 /HPF (ref 0–2)
SP GR UR STRIP: 1.01 (ref 1–1.03)
SQUAMOUS #/AREA URNS AUTO: <1 /HPF (ref 0–1)
URN SPEC COLLECT METH UR: ABNORMAL
UROBILINOGEN UR STRIP-MCNC: NORMAL MG/DL (ref 0–2)
WBC # BLD AUTO: 14.1 10E9/L (ref 4–11)
WBC #/AREA URNS AUTO: 4 /HPF (ref 0–2)
YEAST #/AREA URNS HPF: ABNORMAL /HPF

## 2017-04-14 PROCEDURE — 25000132 ZZH RX MED GY IP 250 OP 250 PS 637: Performed by: SURGERY

## 2017-04-14 PROCEDURE — 12000000 ZZH R&B MED SURG/OB

## 2017-04-14 PROCEDURE — 25000128 H RX IP 250 OP 636: Performed by: PHYSICIAN ASSISTANT

## 2017-04-14 PROCEDURE — 87086 URINE CULTURE/COLONY COUNT: CPT | Performed by: PHYSICIAN ASSISTANT

## 2017-04-14 PROCEDURE — 36415 COLL VENOUS BLD VENIPUNCTURE: CPT | Performed by: SURGERY

## 2017-04-14 PROCEDURE — 25000125 ZZHC RX 250: Performed by: SURGERY

## 2017-04-14 PROCEDURE — 81001 URINALYSIS AUTO W/SCOPE: CPT | Performed by: PHYSICIAN ASSISTANT

## 2017-04-14 PROCEDURE — 85027 COMPLETE CBC AUTOMATED: CPT | Performed by: SURGERY

## 2017-04-14 PROCEDURE — 71010 XR CHEST PORT 1 VW: CPT

## 2017-04-14 PROCEDURE — 25800025 ZZH RX 258: Performed by: PHYSICIAN ASSISTANT

## 2017-04-14 PROCEDURE — 99231 SBSQ HOSP IP/OBS SF/LOW 25: CPT | Performed by: PHYSICIAN ASSISTANT

## 2017-04-14 PROCEDURE — 25000132 ZZH RX MED GY IP 250 OP 250 PS 637: Performed by: PHYSICIAN ASSISTANT

## 2017-04-14 PROCEDURE — 25000128 H RX IP 250 OP 636: Performed by: SURGERY

## 2017-04-14 RX ADMIN — ALPRAZOLAM 0.5 MG: 0.5 TABLET ORAL at 18:56

## 2017-04-14 RX ADMIN — ALPRAZOLAM 0.5 MG: 0.5 TABLET ORAL at 09:51

## 2017-04-14 RX ADMIN — HYDROXYZINE HYDROCHLORIDE 25 MG: 25 TABLET ORAL at 14:42

## 2017-04-14 RX ADMIN — MULTIPLE VITAMINS W/ MINERALS TAB 1 TABLET: TAB at 08:19

## 2017-04-14 RX ADMIN — PROCHLORPERAZINE EDISYLATE 10 MG: 5 INJECTION INTRAMUSCULAR; INTRAVENOUS at 06:27

## 2017-04-14 RX ADMIN — ENOXAPARIN SODIUM 40 MG: 40 INJECTION SUBCUTANEOUS at 01:06

## 2017-04-14 RX ADMIN — POTASSIUM CHLORIDE, DEXTROSE MONOHYDRATE AND SODIUM CHLORIDE: 150; 5; 450 INJECTION, SOLUTION INTRAVENOUS at 12:01

## 2017-04-14 RX ADMIN — HYDROMORPHONE HYDROCHLORIDE 0.5 MG: 1 INJECTION, SOLUTION INTRAMUSCULAR; INTRAVENOUS; SUBCUTANEOUS at 06:36

## 2017-04-14 RX ADMIN — FOLIC ACID 1 MG: 1 TABLET ORAL at 08:19

## 2017-04-14 RX ADMIN — ACETAMINOPHEN 650 MG: 325 TABLET, FILM COATED ORAL at 20:56

## 2017-04-14 RX ADMIN — POTASSIUM CHLORIDE, DEXTROSE MONOHYDRATE AND SODIUM CHLORIDE: 150; 5; 450 INJECTION, SOLUTION INTRAVENOUS at 02:17

## 2017-04-14 RX ADMIN — OXYCODONE HYDROCHLORIDE AND ACETAMINOPHEN 1 TABLET: 5; 325 TABLET ORAL at 11:44

## 2017-04-14 RX ADMIN — KETOROLAC TROMETHAMINE 30 MG: 30 INJECTION, SOLUTION INTRAMUSCULAR at 14:42

## 2017-04-14 RX ADMIN — ONDANSETRON 4 MG: 4 TABLET, ORALLY DISINTEGRATING ORAL at 18:56

## 2017-04-14 RX ADMIN — KETOROLAC TROMETHAMINE 30 MG: 30 INJECTION, SOLUTION INTRAMUSCULAR at 08:19

## 2017-04-14 RX ADMIN — HYDROMORPHONE HYDROCHLORIDE 0.5 MG: 1 INJECTION, SOLUTION INTRAMUSCULAR; INTRAVENOUS; SUBCUTANEOUS at 04:41

## 2017-04-14 RX ADMIN — PROCHLORPERAZINE EDISYLATE 10 MG: 5 INJECTION INTRAMUSCULAR; INTRAVENOUS at 11:44

## 2017-04-14 RX ADMIN — HYDROXYZINE HYDROCHLORIDE 50 MG: 50 TABLET, FILM COATED ORAL at 04:21

## 2017-04-14 RX ADMIN — Medication 100 MG: at 08:20

## 2017-04-14 RX ADMIN — POTASSIUM CHLORIDE, DEXTROSE MONOHYDRATE AND SODIUM CHLORIDE: 150; 5; 450 INJECTION, SOLUTION INTRAVENOUS at 23:20

## 2017-04-14 RX ADMIN — ALPRAZOLAM 0.5 MG: 0.5 TABLET ORAL at 02:17

## 2017-04-14 RX ADMIN — KETOROLAC TROMETHAMINE 30 MG: 30 INJECTION, SOLUTION INTRAMUSCULAR at 20:51

## 2017-04-14 RX ADMIN — TRAZODONE HYDROCHLORIDE 100 MG: 100 TABLET ORAL at 23:20

## 2017-04-14 RX ADMIN — ONDANSETRON 4 MG: 2 INJECTION INTRAMUSCULAR; INTRAVENOUS at 08:26

## 2017-04-14 RX ADMIN — SENNOSIDES AND DOCUSATE SODIUM 1 TABLET: 8.6; 5 TABLET ORAL at 23:20

## 2017-04-14 RX ADMIN — KETOROLAC TROMETHAMINE 30 MG: 30 INJECTION, SOLUTION INTRAMUSCULAR at 02:17

## 2017-04-14 RX ADMIN — ENOXAPARIN SODIUM 40 MG: 40 INJECTION SUBCUTANEOUS at 23:20

## 2017-04-14 RX ADMIN — SENNOSIDES AND DOCUSATE SODIUM 1 TABLET: 8.6; 5 TABLET ORAL at 01:06

## 2017-04-14 RX ADMIN — ONDANSETRON 4 MG: 2 INJECTION INTRAMUSCULAR; INTRAVENOUS at 01:15

## 2017-04-14 NOTE — PLAN OF CARE
"Problem: Pain, Acute (Adult)  Goal: Acceptable Pain Control/Comfort Level  Patient will demonstrate the desired outcomes by discharge/transition of care.   Outcome: Improving  Pt C/O abdominal pain, nausea. Pain diminished after Toradol, requested Percocet and Compazine at 1144. Reports increases anxiety at 1430, pain \"better\" denies nausea. Unable to give Xanax as less than 6 hr since previous dose. 1442 Pt given Atarax and Toradol, pt reports feeling less anxious at this time. Plan discussed with pt to avoid Compazine to determine if that is the medication that increased his anxiety, pt agrees. Pt ambulated halls x3 today with STAND BY ASSIST. Tolerating small amount regular diet, good po fluids. Pt having liquid stool x 3 today, small amounts, voiding missed collection most attempts today. Post void bladder [id number]. Pt agreed to sit longer to empty bladder, did void 200 cc magaly urine. Dr Sue removed surgical dressing, staples remain D/I.           "

## 2017-04-14 NOTE — PLAN OF CARE
Problem: Goal Outcome Summary  Goal: Goal Outcome Summary  Outcome: No Change  Patient alert and oriented. Up with stand by assist to bathroom. Patient nauseous requesting Zofran and compazine for nausea. Patient coughing up phlegm, clear thick sputum, that he is afraid he might choke on, he will spit that out anywhere.  Patient taking Xanax for anxiety. Pain not well controlled with Torodal and Dilaudid.  Patient passing gas and had small loose bowel movement. Dressing has small amount of drainage. Ice on dressing. Patient was instructed to splint incision with coughing.  Patient encouraged to walk halls.   Patient only able to void small amounts(10-25 ml) of magaly urine. Straight cathed him for 400 ml at 0400.

## 2017-04-14 NOTE — PLAN OF CARE
Problem: Goal Outcome Summary  Goal: Goal Outcome Summary  Outcome: No Change  Temperature 100.4. Tylenol given. Patient encouraged to use IS. Using pillow to splint cough. Patient currently sleeping in bed. Had declined to use PCD's. He has ambulated in the hallway 3 times today. Encouraged him to walk 1 to 2 more times tonight.

## 2017-04-14 NOTE — PROGRESS NOTES
"Pt sat in chair 20 minutes.Informed of plan for Surgeon to arrive within the hour. Explained need for activity to improve lung/bowel status. Pt stated understanding. C/O nausea and \"too tired\" Encouraged to stay in chair for breakfast, pt took 2 bites then said \"Im going to bed\". Assist 1 to bed. Toradol and Zofran given per request.  "

## 2017-04-14 NOTE — PROGRESS NOTES
Subjective: Patient continues to be nauseated.  No vomiting.  This morning passed gas and stool.  Tolerating diet, but not eating much.        I/O last 3 completed shifts:  In: 3213 [P.O.:700; I.V.:2513]  Out: 625 [Urine:625]     No current outpatient prescriptions on file.         ROUTINE IP LABS (Last four results)  BMP  Recent Labs  Lab 04/13/17  0650 04/12/17  0923 04/11/17 1920    136 136   POTASSIUM 4.0 4.0 3.9   CHLORIDE 103 103 100   TAMI 8.5 8.3* 9.1   CO2 25 23 25   BUN 15 16 16   CR 0.75 0.74 0.77   * 179* 133*     CBC  Recent Labs  Lab 04/14/17  0640 04/13/17  0650 04/12/17  0923 04/11/17 1920   WBC 14.1* 14.4* 19.0* 17.5*   RBC 4.36* 4.40 4.94 5.06   HGB 13.1* 13.5 15.0 15.5   HCT 39.5* 40.0 44.2 44.4   MCV 91 91 90 88   MCH 30.0 30.7 30.4 30.6   MCHC 33.2 33.8 33.9 34.9   RDW 12.9 12.9 12.9 12.6    287 368 360     INRNo lab results found in last 7 days.         Abebrile, but low grade temps to 100  Tcurrent: 98.2   B/P: 153/90  P: 102  R: 18        EXAM  Alert and orinted.  Lips are dry.  Pain controlled  Appears a bit dyspneic, but denies.  Has a cough.    Regular rate and rhythm  Distended, and tympanitic with audible BS.  Wounds OK.  Appropriately tender.  No CCE          A/P: POD #3 laparotomy.  His ileus is slow to resolve.  White count is down to 14.  His temps are likely secondary to atelectasis.  Agree with CXR today.  I anticipate another 2 days until he is ready for discharge.  Dr. Nash will cover this weekend.  I'll write for a regular diet in the AM, provided the ileus continues to resolve.     Sajan Sue MD FACS

## 2017-04-14 NOTE — PROGRESS NOTES
[facility] Medicine Progress Note  Date of Service: 04/14/2017    Assessment & Plan   Ugo Mancia is a 56 year old male with HLD, HTN, tremor, alcohol abuse who presented on 4/11/2017 with abdominal pain with nausea and emesis.    Acute Appendicitis s/p Appendectomy complicated with Ileus   Presented with abdominal pain with nausea and emesis. Febrile with leukocytosis. CT noted inflammatory change and enlargement of the appendix. Discussed with general surgery and appendectomy was performed (Dr. Sue).     Pain is stable or slightly improved from yesterday, retains abdominal distention and nausea.   -wound care, DVT ppx, pain management, PT/OT per surgery  -continue stool softeners  -encouraged IS  -continue to advance diet only if tolerated    Cough   Productive cough with fever. WBC stable. Lungs sounds clear on exam. CXR negative. Likely some atelectasis causing fever.  -continue to monitor    Mild Pyuria   WBC 4  -urine culture pending    Urinary Retention   BS and cath'd for about 700mL. Likely due to anesthesia and narcotics   Resolved evening 4/12  -continue to monitor, insert rausch per protocol if requiring multiple straight caths    Shoulder Pain, Right   Only with certain movements and weight bearing. Likely due to diaphragm irritation from insufflation from laparoscopic surgery. Did not have prior to surgery.   Improved.  -continue to monitor    Alcohol Abuse   Drinks on weekends - not every weekend but can be 6-12 beers in one sitting. No history of withdrawal    No signs of withdrawal.   -continue CIWA with symptom triggered ativan  -oral vitamins    Anxiety   Stable.  -continue PTA Xanax    HTN   Has been well controlled, increasing this afternoon  -continue PTA propranolol    Essential Tremor  -continue PTA propranolol    HLD  -continue PTA crestor    Insomnia  -continue PTA trazodone    Allergic Rhinitis  -holding PTA Claritin and Flonase    DVT Prophylaxis:  Lovenox  Code Status: Full Code    Lines: PIV   Freed catheter: None    Discussion: Continues to retain abdominal pain/distention unable to tolerate PO intake.     Disposition: Anticipate discharge 1-2 days pending above.    Attestation:  I have reviewed today's vital signs, notes, medications, labs and imaging.  Total time: 25 minutes    I have discussed patient with Dr. Tripathi. Assessment and plan as above.    Kathryn Machuca PA-C      Interval History    Minimal activity with increased pain and abdominal distention per staff. Fever with tachycardia this AM. WBC is stable. Coughing up sputum. Passing flatus and minimal stool.    Patient's abdominal pain is stable or slightly improved today. Retains nausea. Feels distention is the same as yesterday. Patient is voiding well and passing some flatus, small BM.     Denies fever, chills, CP, SOB, lightheadedness.    Physical Exam   Temp:  [98.2  F (36.8  C)-100.4  F (38  C)] 98.2  F (36.8  C)  Pulse:  [] 102  Resp:  [18] 18  BP: (134-153)/(84-90) 153/90  SpO2:  [93 %-94 %] 93 %    Weights:   Vitals:    04/11/17 1904 04/13/17 0628   Weight: 95.3 kg (210 lb) 104.5 kg (230 lb 6.1 oz)    There is no height or weight on file to calculate BMI.    Constitutional: Appears uncomfortable. Alert and orientated. NAD. Non-toxic  CV: Regular rate and rhythm. No murmurs noted. Radial pulses are 2+ bilaterally. No lower extremity edema  Respiratory: CTA bilaterally without crackles, wheezes or rhonchi.  GI: Soft, tender near incision sites. BS present but quiet. Abdomen is distended - stable from yesterday. Percussion reveals tympany.   Skin: Warm and dry. Did not assess incisions but dressings have serosanguinous drainage.  Musculoskeletal: Moves all four extremities appropriately.     Data     Recent Labs  Lab 04/14/17  0640 04/13/17  0650 04/12/17  0923 04/11/17  1920   WBC 14.1* 14.4* 19.0* 17.5*   HGB 13.1* 13.5 15.0 15.5   MCV 91 91 90 88    287 368 360   NA  --   137 136 136   POTASSIUM  --  4.0 4.0 3.9   CHLORIDE  --  103 103 100   CO2  --  25 23 25   BUN  --  15 16 16   CR  --  0.75 0.74 0.77   ANIONGAP  --  9 10 11   TAMI  --  8.5 8.3* 9.1   GLC  --  161* 179* 133*   ALBUMIN  --   --   --  4.1   PROTTOTAL  --   --   --  7.8   BILITOTAL  --   --   --  1.2   ALKPHOS  --   --   --  84   ALT  --   --   --  31   AST  --   --   --  8   LIPASE  --   --   --  97       Recent Labs  Lab 04/13/17  0650 04/12/17  0923 04/11/17  1920   * 179* 133*        Unresulted Labs Ordered in the Past 30 Days of this Admission     No orders found from 2/10/2017 to 4/12/2017.         Imaging  No results found for this or any previous visit (from the past 24 hour(s)).   I reviewed all new labs and imaging results over the last 24 hours. I personally reviewed no images or EKG's today.    Medications     dextrose 5% and 0.45% NaCl + KCl 20 mEq/L 100 mL/hr at 04/14/17 0217       sodium chloride (PF)  3 mL Intracatheter Q8H     enoxaparin  40 mg Subcutaneous Q24H     thiamine  100 mg Oral Daily     folic acid  1 mg Oral Daily     multivitamin, therapeutic with minerals  1 tablet Oral Daily     senna-docusate  1 tablet Oral At Bedtime     I have discussed patient with Dr. Tripathi. Assessment and plan as above.    Kathryn Machuca PA-C

## 2017-04-15 VITALS
SYSTOLIC BLOOD PRESSURE: 160 MMHG | DIASTOLIC BLOOD PRESSURE: 88 MMHG | RESPIRATION RATE: 18 BRPM | TEMPERATURE: 98.4 F | WEIGHT: 234.35 LBS | HEART RATE: 94 BPM | OXYGEN SATURATION: 96 %

## 2017-04-15 PROBLEM — K35.80 ACUTE APPENDICITIS: Status: RESOLVED | Noted: 2017-04-12 | Resolved: 2017-04-15

## 2017-04-15 LAB
ANION GAP SERPL CALCULATED.3IONS-SCNC: 9 MMOL/L (ref 3–14)
BASOPHILS # BLD AUTO: 0 10E9/L (ref 0–0.2)
BASOPHILS NFR BLD AUTO: 0.3 %
BUN SERPL-MCNC: 12 MG/DL (ref 7–30)
CALCIUM SERPL-MCNC: 8.6 MG/DL (ref 8.5–10.1)
CHLORIDE SERPL-SCNC: 103 MMOL/L (ref 94–109)
CO2 SERPL-SCNC: 27 MMOL/L (ref 20–32)
CREAT SERPL-MCNC: 0.78 MG/DL (ref 0.66–1.25)
DIFFERENTIAL METHOD BLD: ABNORMAL
EOSINOPHIL # BLD AUTO: 0.3 10E9/L (ref 0–0.7)
EOSINOPHIL NFR BLD AUTO: 3 %
ERYTHROCYTE [DISTWIDTH] IN BLOOD BY AUTOMATED COUNT: 12.5 % (ref 10–15)
GFR SERPL CREATININE-BSD FRML MDRD: ABNORMAL ML/MIN/1.7M2
GLUCOSE SERPL-MCNC: 118 MG/DL (ref 70–99)
HCT VFR BLD AUTO: 38.7 % (ref 40–53)
HGB BLD-MCNC: 12.9 G/DL (ref 13.3–17.7)
IMM GRANULOCYTES # BLD: 0.1 10E9/L (ref 0–0.4)
IMM GRANULOCYTES NFR BLD: 0.7 %
LYMPHOCYTES # BLD AUTO: 0.7 10E9/L (ref 0.8–5.3)
LYMPHOCYTES NFR BLD AUTO: 7.8 %
MCH RBC QN AUTO: 30.4 PG (ref 26.5–33)
MCHC RBC AUTO-ENTMCNC: 33.3 G/DL (ref 31.5–36.5)
MCV RBC AUTO: 91 FL (ref 78–100)
MONOCYTES # BLD AUTO: 1 10E9/L (ref 0–1.3)
MONOCYTES NFR BLD AUTO: 11 %
NEUTROPHILS # BLD AUTO: 7.1 10E9/L (ref 1.6–8.3)
NEUTROPHILS NFR BLD AUTO: 77.2 %
PLATELET # BLD AUTO: 320 10E9/L (ref 150–450)
POTASSIUM SERPL-SCNC: 4 MMOL/L (ref 3.4–5.3)
RBC # BLD AUTO: 4.25 10E12/L (ref 4.4–5.9)
SODIUM SERPL-SCNC: 139 MMOL/L (ref 133–144)
WBC # BLD AUTO: 9.2 10E9/L (ref 4–11)

## 2017-04-15 PROCEDURE — 99239 HOSP IP/OBS DSCHRG MGMT >30: CPT | Performed by: FAMILY MEDICINE

## 2017-04-15 PROCEDURE — 25000132 ZZH RX MED GY IP 250 OP 250 PS 637: Performed by: SURGERY

## 2017-04-15 PROCEDURE — 36415 COLL VENOUS BLD VENIPUNCTURE: CPT | Performed by: FAMILY MEDICINE

## 2017-04-15 PROCEDURE — 25000125 ZZHC RX 250: Performed by: SURGERY

## 2017-04-15 PROCEDURE — 80048 BASIC METABOLIC PNL TOTAL CA: CPT | Performed by: FAMILY MEDICINE

## 2017-04-15 PROCEDURE — 25000132 ZZH RX MED GY IP 250 OP 250 PS 637: Performed by: PHYSICIAN ASSISTANT

## 2017-04-15 PROCEDURE — 85025 COMPLETE CBC W/AUTO DIFF WBC: CPT | Performed by: FAMILY MEDICINE

## 2017-04-15 RX ORDER — OXYCODONE AND ACETAMINOPHEN 5; 325 MG/1; MG/1
1-2 TABLET ORAL EVERY 4 HOURS PRN
Qty: 30 TABLET | Refills: 0 | Status: SHIPPED | OUTPATIENT
Start: 2017-04-15

## 2017-04-15 RX ORDER — OXYCODONE AND ACETAMINOPHEN 5; 325 MG/1; MG/1
1-2 TABLET ORAL
Status: DISCONTINUED | OUTPATIENT
Start: 2017-04-15 | End: 2017-04-15 | Stop reason: HOSPADM

## 2017-04-15 RX ORDER — HYDROXYZINE HYDROCHLORIDE 25 MG/1
25-50 TABLET, FILM COATED ORAL EVERY 6 HOURS PRN
Qty: 30 TABLET | Refills: 1 | Status: SHIPPED | OUTPATIENT
Start: 2017-04-15

## 2017-04-15 RX ADMIN — MULTIPLE VITAMINS W/ MINERALS TAB 1 TABLET: TAB at 07:44

## 2017-04-15 RX ADMIN — FOLIC ACID 1 MG: 1 TABLET ORAL at 07:44

## 2017-04-15 RX ADMIN — Medication 100 MG: at 07:44

## 2017-04-15 RX ADMIN — ONDANSETRON 4 MG: 4 TABLET, ORALLY DISINTEGRATING ORAL at 09:36

## 2017-04-15 RX ADMIN — ALPRAZOLAM 0.5 MG: 0.5 TABLET ORAL at 07:44

## 2017-04-15 RX ADMIN — HYDROXYZINE HYDROCHLORIDE 25 MG: 25 TABLET ORAL at 09:36

## 2017-04-15 RX ADMIN — OXYCODONE HYDROCHLORIDE AND ACETAMINOPHEN 1 TABLET: 5; 325 TABLET ORAL at 11:41

## 2017-04-15 NOTE — PHARMACY - DISCHARGE MEDICATION RECONCILIATION
Discharge medication review for this patient is complete. Pharmacist assisted with medication reconciliation of discharge medications with prior to admission medications.     The following changes were made to the discharge medication list based on pharmacist review:  Added:  Percocet, hydoxyzine and augmentin  Discontinued: NA  Changed: NA      Patient's Discharge Medication List  - medications as listed on After Visit Summary (AVS)     Review of your medicines      START taking       Dose / Directions    amoxicillin-clavulanate 875-125 MG per tablet   Commonly known as:  AUGMENTIN   Used for:  Cellulitis of other specified site        Dose:  1 tablet   Take 1 tablet by mouth 2 times daily   Quantity:  20 tablet   Refills:  0       hydrOXYzine 25 MG tablet   Commonly known as:  ATARAX   Used for:  Acute post-operative pain        Dose:  25-50 mg   Take 1-2 tablets (25-50 mg) by mouth every 6 hours as needed for itching   Quantity:  30 tablet   Refills:  1       oxyCODONE-acetaminophen 5-325 MG per tablet   Commonly known as:  PERCOCET   Used for:  Acute post-operative pain        Dose:  1-2 tablet   Take 1-2 tablets by mouth every 4 hours as needed for pain (moderate to severe)   Quantity:  30 tablet   Refills:  0         CONTINUE these medicines which have NOT CHANGED       Dose / Directions    ALPRAZolam 0.5 MG tablet   Commonly known as:  XANAX        Dose:  0.5 mg   Take 0.5 mg by mouth every 6 hours as needed   Refills:  0       aminocaproic acid 500 MG tablet   Commonly known as:  AMICAR        Dose:  500 mg   Take 500 mg by mouth daily   Refills:  0       CRESTOR PO        Dose:  10 mg   Take 10 mg by mouth daily   Refills:  0       fluticasone 50 MCG/ACT spray   Commonly known as:  FLONASE        Dose:  1 spray   Spray 1 spray into both nostrils 2 times daily   Refills:  0       INDERAL LA PO        Dose:  60 mg   Take 60 mg by mouth daily as needed for high blood pressure   Refills:  0       loratadine 10  MG tablet   Commonly known as:  CLARITIN        Dose:  10 mg   Take 10 mg by mouth daily   Refills:  0       MILK THISTLE PO        Dose:  1 capsule   Take 1 capsule by mouth daily   Refills:  0       Multi-vitamin Tabs tablet        Dose:  1 tablet   Take 1 tablet by mouth daily   Refills:  0       psyllium 0.52 G capsule        Dose:  5 capsule   Take 5 capsules by mouth every evening   Refills:  0       TRAZODONE HCL PO        Dose:  100 mg   Take 100 mg by mouth nightly as needed   Refills:  0            Where to get your medicines      These medications were sent to [facility] [place], [place] - [address]  [address] [address]     Phone:  [phone]      amoxicillin-clavulanate 875-125 MG per tablet     hydrOXYzine 25 MG tablet         Some of these will need a paper prescription and others can be bought over the counter. Ask your nurse if you have questions.     Bring a paper prescription for each of these medications      oxyCODONE-acetaminophen 5-325 MG per tablet
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
## 2017-04-15 NOTE — PROGRESS NOTES
Pt is doing well, tolerating diet and passing gas and BM's.  He is not having much pain requiring narcotics.  Incision:  Slight erythema, but no drainage.    A/P:  Home today on PO antibiotics.    Edmond Nash MD, FACS

## 2017-04-15 NOTE — PLAN OF CARE
"Problem: Goal Outcome Summary  Goal: Goal Outcome Summary  Outcome: Improving  0740 Pt sitting in chair, asking for Xanax, \"I don't need any pain medicine now\", \"when can I go home\". Note redness at incision, warm to touch, no drainage. Ice applied. Dr Nash informed.  Pt tolerated 50% regular diet. Denies nausea after eating then asked for Zofran and Atarax for comfort. Pt voiding without difficulty, reported having 1 soft stool this am, not observed. Cont to monitor.      "

## 2017-04-15 NOTE — DISCHARGE SUMMARY
[facility] Discharge Summary    Ugo Mancia MRN# [id number]   Age: 56 year old YOB: 1960     Date of Admission:  4/11/2017  Date of Discharge::  4/15/2017  Admitting Physician:  Artem Dowd MD  Discharge Physician:  Ezequiel Tripathi MD, MD             Admission Diagnoses:   Acute appendicitis with localized peritonitis [K35.3]          Principle Discharge Diagnosis:   Acute Appendicitis s/p Appendectomy complicated with Ileus    See hospital course for further active diagnoses addressed during this admission.            Procedures:   See EMR for details on open appendectomy by general surgery          Medications Prior to Admission:     Prescriptions Prior to Admission   Medication Sig Dispense Refill Last Dose     loratadine (CLARITIN) 10 MG tablet Take 10 mg by mouth daily   4/11/2017 at am     MILK THISTLE PO Take 1 capsule by mouth daily   4/10/2017 at am     psyllium 0.52 G capsule Take 5 capsules by mouth every evening   4/10/2017 at pm     aminocaproic acid (AMICAR) 500 MG tablet Take 500 mg by mouth daily   4/10/2017 at am     fluticasone (FLONASE) 50 MCG/ACT spray Spray 1 spray into both nostrils 2 times daily   4/10/2017 at am     ALPRAZolam (XANAX) 0.5 MG tablet Take 0.5 mg by mouth every 6 hours as needed   4/11/2017 at am     Propranolol HCl (INDERAL LA PO) Take 60 mg by mouth daily as needed for high blood pressure   4/10/2017 at am     Rosuvastatin Calcium (CRESTOR PO) Take 10 mg by mouth daily    4/11/2017 at am     TRAZODONE HCL PO Take 100 mg by mouth nightly as needed    4/10/2017 at hs     multivitamin, therapeutic with minerals (MULTI-VITAMIN) TABS Take 1 tablet by mouth daily   4/10/2017 at am             Discharge Medications:     Current Discharge Medication List      START taking these medications    Details   oxyCODONE-acetaminophen (PERCOCET) 5-325 MG per tablet Take 1-2 tablets by mouth every 4 hours as needed for pain (moderate to severe)  Qty: 30 tablet, Refills: 0     Associated Diagnoses: Acute post-operative pain      hydrOXYzine (ATARAX) 25 MG tablet Take 1-2 tablets (25-50 mg) by mouth every 6 hours as needed for itching  Qty: 30 tablet, Refills: 1    Associated Diagnoses: Acute post-operative pain      amoxicillin-clavulanate (AUGMENTIN) 875-125 MG per tablet Take 1 tablet by mouth 2 times daily  Qty: 20 tablet, Refills: 0    Associated Diagnoses: Cellulitis of other specified site         CONTINUE these medications which have NOT CHANGED    Details   loratadine (CLARITIN) 10 MG tablet Take 10 mg by mouth daily      MILK THISTLE PO Take 1 capsule by mouth daily      psyllium 0.52 G capsule Take 5 capsules by mouth every evening      aminocaproic acid (AMICAR) 500 MG tablet Take 500 mg by mouth daily      fluticasone (FLONASE) 50 MCG/ACT spray Spray 1 spray into both nostrils 2 times daily      ALPRAZolam (XANAX) 0.5 MG tablet Take 0.5 mg by mouth every 6 hours as needed      Propranolol HCl (INDERAL LA PO) Take 60 mg by mouth daily as needed for high blood pressure      Rosuvastatin Calcium (CRESTOR PO) Take 10 mg by mouth daily       TRAZODONE HCL PO Take 100 mg by mouth nightly as needed       multivitamin, therapeutic with minerals (MULTI-VITAMIN) TABS Take 1 tablet by mouth daily                   Consultations:   Consultation during this admission received from general surgery/hospital service          Brief History of Illness:       From Admission H+P:   History of Present Illness: Patient is a 56-year-old man who presents to the emergency room this evening with abdominal pain that started about 4:00 morning. He was morbidly generalized pain when it began and thence moved to the right lower quadrant. He described it is rather severe in nature initially, but had some pain medication in the emergency room and now feels a bit better. He hasn't been able to eat all day. He is not hungry. No vomiting. He had about 5 or 6 bowel movements q. day, were 3 would be  normal.     He presented to the emergency room for evaluation. His white count was noted to be elevated at 17.5. His CT scan done reveals an acutely inflamed appendix in the right lower quadrant with an appendicolith present. This is significant surrounding inflammatory response, but no evidence of perforation. There is no free air or fluid. No other intra-abdominal pathology is noted.     Patient is known to be             TODAY:   Subjective:  Doing well today.  Pain well-controlled.  No fever or chills.  No spread or change in mild redness around incision.  No bloating.  Now passing gas today and had a single bowel movement which was pretty normal by his report.  No nausea or vomiting.  Taking orals well.     ROS:   ROS: 10 point ROS neg other than the symptoms noted above in the HPI.   /88 (BP Location: Left arm)  Pulse 94  Temp 98.4  F (36.9  C) (Oral)  Resp 18  Wt 106.3 kg (234 lb 5.6 oz)  SpO2 96%   EXAM:  General: awake and alert, NAD, oriented x 3  Head: normocephalic  Neck: unremarkable, no lymphadenopathy   HEENT: oropharynx pink and moist    Heart: Regular rate and rhythm, no murmurs, rubs, or gallops  Lungs: clear to auscultation bilaterally with good air movement throughout  Abdomen: soft, mildly tender around incision with mild erythema surrounding incision but no clear fluctuance - slightly improved form yesterday per patient.    Extremities: no edema in lower extremities   Skin unremarkable.            Hospital Course:     Ugo Mancia is a 56 year old male with HLD, HTN, tremor, alcohol abuse who presented on 4/11/2017 with abdominal pain with nausea and emesis.     Acute Appendicitis with perforation seen on pathology s/p Appendectomy complicated with Ileus  4/14/17 -- Presented with abdominal pain with nausea and emesis. Febrile with leukocytosis. CT noted inflammatory change and enlargement of the appendix. Discussed with general surgery and appendectomy was performed (Dr. Sue).    Pain is stable or slightly improved from yesterday, retains abdominal distention and nausea.   -continue stool softeners  4/15/2017 -- improving - WBC normalized, wound unchanged to slightly improved again today per patient - surgery recommendations discharge on augmentin and follow-up with Dr. Sue on Monday (48 hours) which I think is reasonable.  Still some risk that this may need to be reopened per surgery, but trying outpatient oral antibiotics with improvement clinically and in WBC appear reasonable.       Mild Pyuria  WBC 4  -urine culture negative so far - pending on discharge - follow-up results at appointment with surgery on Monday but doubt UTI.       Shoulder Pain, Right  4/13Only with certain movements and weight bearing. Likely due to diaphragm irritation from insufflation from laparoscopic surgery. Did not have prior to surgery.  Improved.  4/14/17 -- -continue to monitor   4/15/2017 -- resolving. No complaints today.    Alcohol Abuse  Drinks on weekends - not every weekend but can be 6-12 beers in one sitting. No history of withdrawal  4/15/2017 -- no issues during admission.  Advised to cut back to not more than 3-4 per day when drinking and < 10 per week.     Anxiety  .4/15/2017 -- recommend addressing this with primary care provider at next appointment as the xanax he takes along with his reported heavy drinking is not an ideal combination      HTN  Has been well controlled, increasing this afternoon  -continued PTA propranolol     Essential Tremor  -continued PTA propranolol     HLD  -continued PTA crestor     Insomnia  -continued PTA trazodone     DVT Prophylaxis: Lovenox    Code Status: Full Code            Discharge Instructions and Follow-Up:   Discharge diet: Regular   Discharge activity: Activity as tolerated   Discharge follow-up: Follow up with Dr. Sue in 2 days, follow-up sooner if any spreading redness, worsening pain or fevers.              Discharge Disposition:   Discharged to  home      Attestation:  I have reviewed today's vital signs, notes, medications, labs and imaging.  Amount of time performed on this discharge summary: 45 minutes.    Ezequiel Tripathi MD, MD

## 2017-04-15 NOTE — PLAN OF CARE
Problem: Discharge Planning  Goal: Discharge Planning (Adult, OB, Behavioral, Peds)  Outcome: Adequate for Discharge Date Met:  04/15/17  [facility]LL[facility] DISCHARGE NOTE     Patient discharged to home at 12:04 PM via wheel chair. Accompanied by spouse and staff. Discharge instructions reviewed with patient and spouse, opportunity offered to ask questions. Prescriptions filled and sent with patient upon discharge. All belongings sent with patient.     Judy Abad

## 2017-04-16 ENCOUNTER — TELEPHONE (OUTPATIENT)
Dept: NURSING | Facility: CLINIC | Age: 57
End: 2017-04-16

## 2017-04-16 LAB
BACTERIA SPEC CULT: NO GROWTH
MICRO REPORT STATUS: NORMAL
SPECIMEN SOURCE: NORMAL

## 2017-04-16 NOTE — TELEPHONE ENCOUNTER
"Call Type: Triage Call    Presenting Problem: \"Pancreatic surgery\". He's having a hard time  breathing.  Triage Note:  Guideline Title: Breathing Problems  Recommended Disposition: Activate   Original Inclination: Did not know what to do  Override Disposition:  Intended Action: Follow advice given  Physician Contacted: No  Sudden onset of severe breathing difficulty ?  YES  Not breathing (no air movement from nose/mouth; no chest or abdomen movement) ? NO  Physician Instructions:  Care Advice: IMMEDIATE ACTION  "

## 2017-04-17 NOTE — OP NOTE
DATE OF PROCEDURE:  04/11/2017.      PREOPERATIVE DIAGNOSIS:  Acute appendicitis.      POSTOPERATIVE DIAGNOSES:  Acute appendicitis, retrocecal, extremely difficult visualization.      PROCEDURES PERFORMED:  Laparoscopic with conversion to open appendectomy.      SURGEON:  Sajan Sue MD      ASSISTANT:  YAW Escalante (I requested Moy's assistance for his expertise with retraction, wound management, hemostasis and wound closure).      ANESTHESIA:  General.      INDICATIONS:  Ugo Mancia is a 56-year-old man who presented with right lower quadrant pain of about 15-hour duration.  He was found on evaluation to have acute appendicitis.  Laparoscopic appendectomy was recommended.  Prior to the procedure, he was counseled about risks, benefits and alternatives of the procedure and he agreed to proceed.  We specifically discussed possibility of an open procedure.  He consented and understood all risks, benefits and alternatives.      DESCRIPTION OF PROCEDURE:  The patient was brought to the operating room, placed on the table in the supine position.  After induction of adequate general endotracheal anesthesia, the patient's abdomen was shaved, prepped and draped in the usual sterile fashion.  A small supraumbilical midline incision was made and dissection carried down through subcutaneous tissues to the level of the fascia.  The fascia was incised in the midline and 2 stay sutures of 0 Vicryl were placed.  The fascia was further elevated and the peritoneal cavity entered.  The Alivia trocar was placed.  Two further 5 mm ports were placed, 1 on the left lower quadrant and 1 in the low midline.  We then inspected the abdomen.  This patient was obese and had a lot of intra-abdominal fat as well as gaseous distention of the colon and small bowel.  The appendix was not immediately located.  The cecum was elevated with extreme difficulty.  The retroperitoneum was visualized.  I actually had to add a fourth 5 mm port in  the right upper quadrant to aid in retraction of the colon medially.  I was then able to take down the lateral peritoneal reflection using the LigaSure device.  The appendix still was not visible.  I continued rotating the colon medially and then finally was able to visualize the appendix.  It was in a retrocecal/retroperitoneal location.  It was densely adherent to the retroperitoneum.  I could not get around it.  I could not visualize it.  As I was taking down some more of the retroperitoneum, I all of a sudden encountered stool and pus.  Clearly, I had entered either an abscess or the appendix itself.  At this point, a laparoscopic approach was abandoned.  All the ports were removed.  The patient was placed flat on the operating table and a midline incision was created.  A separate tray was opened.  The previous tray was counted and removed from the operative field.  A midline incision was made using cautery.  This came around the right side of the umbilicus where he had a small umbilical hernia.  Dissection was carried inferiorly to a level of probably about 5 cm above the pubis.  The subcutaneous tissue was divided with cautery.  The fascia was incised with cautery.  We started from the most superior portion of the wound and entered the peritoneal cavity.  The peritoneum was incised using cautery all the way through the full length of the incision.  The Bookwalter retractor was used because even with good retraction I could not visualize the appendix.  The Bookwalter retractor was placed.  The patient was rotated again toward the left side, and with great difficulty, the appendix was visualized.  Finger fracture technique was used to mobilize the appendix from the retroperitoneum.  The mesoappendix was also adherent to the retroperitoneum.  This was taken down with the LigaSure device.  At this point, I could see on the appendix where I had entered the appendix.  There was no abscess.  There was no evidence of  a colon injury or small bowel injury.  The appendix was mobilized with the LigaSure device back to the base of cecum, which appeared normal.  The appendix was then amputated using the previously opened Endo-ABRAN stapler.  The appendix was sent off the field.  The abdomen was irrigated with 3 liters of warm saline, which was irrigated free.  The irrigant returned clear.  Special attention was paid to the pelvis and pericolic gutters.  The abdomen was then closed after instrument and sponge counts were complete.  The midline fascia was closed with interrupted #1 figure-of-eight Vicryl sutures.  The umbilical hernia was opened and the fascial rim was exposed.  Sutures were used in the fascia to obliterate the umbilical hernia.  All the sutures were placed and then cut.  The subcutaneous tissue was irrigated with remaining saline.  The skin was closed with staples.  Anesthesia then performed a DAPHNE block.  The patient was then awakened from anesthesia, taken to the ICU to recover.  Arrangements were made to admit him to the hospital for continuing care.  The patient tolerated the procedure well.  Other than being difficult, there were no apparent complications.  Blood loss was probably 100 mL total.  Needle, sponge and instrument counts were correct at the conclusion of the procedure.         LASHONDA SUE MD             D: 2017 00:13   T: 2017 10:50   MT: TS      Name:     NICOLLE MCCLURE   MRN:      [id number]        Account:        [id number]   :      1960           Procedure Date: 2017      Document: [id number]       cc: Lashonda Sue MD

## 2018-04-25 ENCOUNTER — HOSPITAL ENCOUNTER (EMERGENCY)
Facility: CLINIC | Age: 58
Discharge: HOME | End: 2018-04-25
Attending: EMERGENCY MEDICINE | Admitting: EMERGENCY MEDICINE
Payer: COMMERCIAL

## 2018-04-25 ENCOUNTER — APPOINTMENT (OUTPATIENT)
Dept: GENERAL RADIOLOGY | Facility: CLINIC | Age: 58
End: 2018-04-25
Attending: EMERGENCY MEDICINE
Payer: COMMERCIAL

## 2018-04-25 ENCOUNTER — APPOINTMENT (OUTPATIENT)
Dept: GENERAL RADIOLOGY | Facility: CLINIC | Age: 58
End: 2018-04-25
Attending: STUDENT IN AN ORGANIZED HEALTH CARE EDUCATION/TRAINING PROGRAM
Payer: COMMERCIAL

## 2018-04-25 VITALS
SYSTOLIC BLOOD PRESSURE: 157 MMHG | BODY MASS INDEX: 30.1 KG/M2 | OXYGEN SATURATION: 98 % | DIASTOLIC BLOOD PRESSURE: 78 MMHG | RESPIRATION RATE: 16 BRPM | TEMPERATURE: 99.1 F | HEIGHT: 71 IN | WEIGHT: 215 LBS | HEART RATE: 98 BPM

## 2018-04-25 DIAGNOSIS — S22.41XA CLOSED FRACTURE OF MULTIPLE RIBS OF RIGHT SIDE, INITIAL ENCOUNTER: ICD-10-CM
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
## 2018-04-25 PROCEDURE — 99284 EMERGENCY DEPT VISIT MOD MDM: CPT | Mod: Z6 | Performed by: EMERGENCY MEDICINE

## 2018-04-25 PROCEDURE — 71100 X-RAY EXAM RIBS UNI 2 VIEWS: CPT | Mod: RT

## 2018-04-25 PROCEDURE — 71046 X-RAY EXAM CHEST 2 VIEWS: CPT

## 2018-04-25 PROCEDURE — 99284 EMERGENCY DEPT VISIT MOD MDM: CPT | Mod: 25

## 2018-04-25 RX ORDER — OXYCODONE AND ACETAMINOPHEN 5; 325 MG/1; MG/1
1-2 TABLET ORAL EVERY 4 HOURS PRN
Qty: 12 TABLET | Refills: 0 | Status: SHIPPED | OUTPATIENT
Start: 2018-04-25

## 2018-04-25 RX ORDER — GABAPENTIN 300 MG/1
300 CAPSULE ORAL 3 TIMES DAILY
Qty: 60 CAPSULE | Refills: 0 | Status: SHIPPED | OUTPATIENT
Start: 2018-04-25

## 2018-04-25 NOTE — ED AVS SNAPSHOT
[facility] Emergency Department    [address] [address]    Phone:  [phone]    Fax:  [phone]                                       Ugo Mancia   MRN: [id number]    Department:  [facility] Emergency Department   Date of Visit:  4/25/2018           After Visit Summary Signature Page     I have received my discharge instructions, and my questions have been answered. I have discussed any challenges I see with this plan with the nurse or doctor.    ..........................................................................................................................................  Patient/Patient Representative Signature      ..........................................................................................................................................  Patient Representative Print Name and Relationship to Patient    ..................................................               ................................................  Date                                            Time    ..........................................................................................................................................  Reviewed by Signature/Title    ...................................................              ..............................................  Date                                                            Time

## 2018-04-25 NOTE — ED AVS SNAPSHOT
[facility] Emergency Department    [address] [address]    Phone:  [phone]    Fax:  [phone]                                       Ugo Mancia   MRN: [id number]    Department:  [facility] Emergency Department   Date of Visit:  4/25/2018           Patient Information     Date Of Birth          1960        Your diagnoses for this visit were:     Fall down stairs, initial encounter     Closed fracture of multiple ribs of right side, initial encounter        You were seen by Tomas Saldivar MD.      Follow-up Information     Follow up with Ian Bansal    Specialty:  Family Practice    Why:  Before the weekend in case you need additional narcotic pain medicines.    Contact information:    [facility]  [address]  [facility] [address]  [phone]        Discharge References/Attachments     RIB FRACTURE (ENGLISH)      24 Hour Appointment Hotline       To make an appointment at any [facility] call [phone] ([phone]). If you don't have a family doctor or clinic, we will help you find one. [facility] are conveniently located to serve the needs of you and your family.             Review of your medicines      START taking        Dose / Directions Last dose taken    gabapentin 300 MG capsule   Commonly known as:  NEURONTIN   Dose:  300 mg   Quantity:  60 capsule        Take 1 capsule (300 mg) by mouth 3 times daily   Refills:  0          Our records show that you are taking the medicines listed below. If these are incorrect, please call your family doctor or clinic.        Dose / Directions Last dose taken    ALPRAZolam 0.5 MG tablet   Commonly known as:  XANAX   Dose:  0.5 mg        Take 0.5 mg by mouth every 6 hours as needed   Refills:  0        aminocaproic acid 500 MG tablet   Commonly known as:  AMICAR   Dose:  500 mg        Take 500 mg by mouth daily   Refills:  0        amoxicillin-clavulanate 875-125 MG per tablet    Commonly known as:  AUGMENTIN   Dose:  1 tablet   Quantity:  20 tablet        Take 1 tablet by mouth 2 times daily   Refills:  0        CRESTOR PO   Dose:  10 mg        Take 10 mg by mouth daily   Refills:  0        fluticasone 50 MCG/ACT spray   Commonly known as:  FLONASE   Dose:  1 spray        Spray 1 spray into both nostrils 2 times daily   Refills:  0        hydrOXYzine 25 MG tablet   Commonly known as:  ATARAX   Dose:  25-50 mg   Quantity:  30 tablet        Take 1-2 tablets (25-50 mg) by mouth every 6 hours as needed for itching   Refills:  1        INDERAL LA PO   Dose:  60 mg        Take 60 mg by mouth daily as needed for high blood pressure   Refills:  0        loratadine 10 MG tablet   Commonly known as:  CLARITIN   Dose:  10 mg        Take 10 mg by mouth daily   Refills:  0        MILK THISTLE PO   Dose:  1 capsule        Take 1 capsule by mouth daily   Refills:  0        Multi-vitamin Tabs tablet   Dose:  1 tablet        Take 1 tablet by mouth daily   Refills:  0        psyllium 0.52 g capsule   Dose:  5 capsule        Take 5 capsules by mouth every evening   Refills:  0        TRAZODONE HCL PO   Dose:  100 mg        Take 100 mg by mouth nightly as needed   Refills:  0          ASK your doctor about these medications        Dose / Directions Last dose taken    * oxyCODONE-acetaminophen 5-325 MG per tablet   Commonly known as:  PERCOCET   Dose:  1-2 tablet   What changed:  Another medication with the same name was added. Make sure you understand how and when to take each.   Quantity:  30 tablet   Ask about: Which instructions should I use?        Take 1-2 tablets by mouth every 4 hours as needed for pain (moderate to severe)   Refills:  0        * oxyCODONE-acetaminophen 5-325 MG per tablet   Commonly known as:  PERCOCET   Dose:  1-2 tablet   What changed:  You were already taking a medication with the same name, and this prescription was added. Make sure you understand how and when to take each.    Quantity:  12 tablet   Ask about: Which instructions should I use?        Take 1-2 tablets by mouth every 4 hours as needed for severe pain   Refills:  0        * Notice:  This list has 2 medication(s) that are the same as other medications prescribed for you. Read the directions carefully, and ask your doctor or other care provider to review them with you.            Information about OPIOIDS     PRESCRIPTION OPIOIDS: WHAT YOU NEED TO KNOW   You have a prescription for an opioid (narcotic) pain medicine. Opioids can cause addiction. If you have a history of chemical dependency of any type, you are at a higher risk of becoming addicted to opioids. Only take this medicine after all other options have been tried. Take it for as short a time and as few doses as possible.     Do not:    Drive. If you drive while taking these medicines, you could be arrested for driving under the influence (DUI).    Operate heavy machinery    Do any other dangerous activities while taking these medicines.     Drink any alcohol while taking these medicines.      Take with any other medicines that contain acetaminophen. Read all labels carefully. Look for the word  acetaminophen  or  Tylenol.  Ask your pharmacist if you have questions or are unsure.    Store your pills in a secure place, locked if possible. We will not replace any lost or stolen medicine. If you don t finish your medicine, please throw away (dispose) as directed by your pharmacist. The [place] [employer] has more information about safe disposal: https://www.pca.[facility].[place].us/living-green/managing-unwanted-medications    All opioids tend to cause constipation. Drink plenty of water and eat foods that have a lot of fiber, such as fruits, vegetables, prune juice, apple juice and high-fiber cereal. Take a laxative (Miralax, milk of magnesia, Colace, Senna) if you don t move your bowels at least every other day.         Prescriptions were sent or printed at  "these locations (2 Prescriptions)                   Other Prescriptions                Printed at Department/Unit printer (2 of 2)         gabapentin (NEURONTIN) 300 MG capsule               oxyCODONE-acetaminophen (PERCOCET) 5-325 MG per tablet                Procedures and tests performed during your visit     XR Chest 2 Views    XR Ribs Right 2 Views      Orders Needing Specimen Collection     None      Pending Results     Date and Time Order Name Status Description    4/25/2018 1916 XR Ribs Right 2 Views In process             Pending Culture Results     No orders found from 4/23/2018 to 4/26/2018.            Pending Results Instructions     If you had any lab results that were not finalized at the time of your Discharge, you can call the ED Lab Result RN at [phone]. You will be contacted by this team for any positive Lab results or changes in treatment. The nurses are available 7 days a week from 10A to 6:30P.  You can leave a message 24 hours per day and they will return your call.        Test Results From Your Hospital Stay        4/25/2018  7:34 PM      Narrative     CHEST TWO VIEWS  4/25/2018 6:49 PM     HISTORY: Fall, pain.    COMPARISON: 4/14/2017        Impression     IMPRESSION: Normal.    RODO BILL MD         4/25/2018  7:29 PM      Result not yet available     Exam Ended                Thank you for choosing [facility]       Thank you for choosing [facility] for your care. Our goal is always to provide you with excellent care. Hearing back from our patients is one way we can continue to improve our services. Please take a few minutes to complete the written survey that you may receive in the mail after you visit with us. Thank you!        [facility] Information     [facility] lets you send messages to your doctor, view your test results, renew your prescriptions, schedule appointments and more. To sign up, go to [email or web address] . Click on \"Log in\" on the left side of the screen, which will take " "you to the Welcome page. Then click on \"Sign up Now\" on the right side of the page.     You will be asked to enter the access code listed below, as well as some personal information. Please follow the directions to create your username and password.     Your access code is: [email or web address]  Expires: 2018  8:19 PM     Your access code will  in 90 days. If you need help or a new code, please call your [place] clinic or [phone].        Care EveryWhere ID     This is your Care EveryWhere ID. This could be used by other organizations to access your [place] medical records  [id number]        Equal Access to Services     BRANDI COON : Minal Du, janel feliz, treva aguero, ajay drake. So [facility] [phone].    ATENCIÓN: Si habla español, tiene a patterson disposición servicios gratuitos de asistencia lingüística. Llame al [phone].    We comply with applicable federal civil rights laws and [place] laws. We do not discriminate on the basis of race, color, national origin, age, disability, sex, sexual orientation, or gender identity.            After Visit Summary       This is your record. Keep this with you and show to your community pharmacist(s) and doctor(s) at your next visit.                  "

## 2018-04-25 NOTE — ED NOTES
Pt states fell on steps on Sunday - c/o pain to right rib area - worse with deep inspiration. Bilat lung sounds are equal - patient will splint with breathing due to pain. Patient is alert and obviously uncomfortable.

## 2018-04-26 NOTE — ED PROVIDER NOTES
"  History     Chief Complaint   Patient presents with     Fall     Sun night,     Chest Wall Pain     pain worse with deep breath     HPI  Ugo Mancia is a 57 year old male who presents with complaints of right lateral chest pain since he fell last Sunday night.  Patient states he had \"a few cocktails\" and unfortunately slipped and fell on some steps.  Patient hit his right lateral ribs on the edge of the step and since that time his had moderate pain that is worse with deep breathing or coughing.  He has no hemoptysis.  Denies shortness of breath but just has pain with deep breathing.  No fever or chills.  No back pain.  No hematuria.  No abdominal injury.  Denies other injury with the incident.  Over-the-counter pain meds without relief.  No previous injury to the area.    Problem List:    There are no active problems to display for this patient.       Past Medical History:    No past medical history on file.    Past Surgical History:    Past Surgical History:   Procedure Laterality Date     APPENDECTOMY OPEN N/A 4/11/2017    Procedure: APPENDECTOMY OPEN;  Surgeon: Sajan Sue MD;  Location: [facility] OR       Family History:    No family history on file.    Social History:  Marital Status:   [2]  Social History   Substance Use Topics     Smoking status: Not on file     Smokeless tobacco: Not on file     Alcohol use Not on file        Medications:      ALPRAZolam (XANAX) 0.5 MG tablet   aminocaproic acid (AMICAR) 500 MG tablet   amoxicillin-clavulanate (AUGMENTIN) 875-125 MG per tablet   fluticasone (FLONASE) 50 MCG/ACT spray   gabapentin (NEURONTIN) 300 MG capsule   hydrOXYzine (ATARAX) 25 MG tablet   loratadine (CLARITIN) 10 MG tablet   MILK THISTLE PO   multivitamin, therapeutic with minerals (MULTI-VITAMIN) TABS   oxyCODONE-acetaminophen (PERCOCET) 5-325 MG per tablet   oxyCODONE-acetaminophen (PERCOCET) 5-325 MG per tablet   Propranolol HCl (INDERAL LA PO)   psyllium 0.52 G capsule   Rosuvastatin Calcium " "(CRESTOR PO)   TRAZODONE HCL PO         Review of Systems all other systems reviewed and are negative.    Physical Exam   BP: (!) 187/94  Pulse: 98  Heart Rate: 108  Temp: 99.1  F (37.3  C)  Resp: 18  Height: 179.1 cm (5' 10.5\")  Weight: 97.5 kg (215 lb)  SpO2: 98 %      Physical Exam general alert cooperative male in mild to moderate distress.  HEENT is atraumatic.  Neck is supple without limitation.  Lungs are clear without adventitious sounds.  Bony spine is nontender.  He has no CVA tenderness.  Examination of his lateral ribs there is no abrasions or bruising.  He has diffuse tenderness that does not localize.  No crepitus or step-off.   Abdomen is benign.  Extremities are atraumatic including the right upper extremity.    ED Course     ED Course     Procedures           Results for orders placed or performed during the hospital encounter of 04/25/18   XR Chest 2 Views    Narrative    CHEST TWO VIEWS  4/25/2018 6:49 PM     HISTORY: Fall, pain.    COMPARISON: 4/14/2017      Impression    IMPRESSION: Normal.    RODO BILL MD   XR Ribs Right 2 Views    Narrative    RIBS RIGHT TWO VIEW   4/25/2018 7:37 PM     HISTORY: Fall with multiple rib fractures.     COMPARISON: None.      Impression    IMPRESSION: Right fourth, fifth and sixth posterior lateral rib  fractures are present. There is no evidence for pneumothorax.    RODO BILL MD            Critical Care time:  none               Results for orders placed or performed during the hospital encounter of 04/25/18 (from the past 24 hour(s))   XR Chest 2 Views    Narrative    CHEST TWO VIEWS  4/25/2018 6:49 PM     HISTORY: Fall, pain.    COMPARISON: 4/14/2017      Impression    IMPRESSION: Normal.    RODO BILL MD   XR Ribs Right 2 Views    Narrative    RIBS RIGHT TWO VIEW   4/25/2018 7:37 PM     HISTORY: Fall with multiple rib fractures.     COMPARISON: None.      Impression    IMPRESSION: Right fourth, fifth and sixth posterior lateral rib  fractures are " present. There is no evidence for pneumothorax.    RODO BILL MD       Medications - No data to display  Chest x-ray with right rib detail  Patient is noted to have 2 rib fractures.  Assessments & Plan (with Medical Decision Making)   Patient is a 57-year-old male presents with right-sided chest pain since Sunday when he fell while going down steps.  Injured the right side of his chest on the edge of the steps.  Since that time his had moderate and worsening pain.  No head or neck injury with the incident.  No abdominal injury.  Denies any hematuria or flank pain.  No injury to the extremities.  He states he has had some coughing but it is nonbloody and nonproductive.  Does not feel short of breath but hurts to take a deep breath.  Certain movements of his arm make the pain markedly worse.  On presentation patient was afebrile and vitally stable.  Is not hypoxic.  He was initially hypertensive and tachycardic but this improved without treatment.  He had normal lung auscultation.  No CVA tenderness.  On palpation he was diffusely tender in the right axillary and anterior chest.  No crepitus or step-off is felt.  No bruising or abrasions are noted.  Normal cardiac auscultation.  Benign abdominal exam.  Extremities are atraumatic.  X-ray shows right fourth and fifth rib fractures as noted above.  Patient is given information on rib fracture.  He will be placed on Neurontin 2 alleviate nerve pain.  He is given Percocet for severe pain.  I have asked him to see his doctor Friday before the weekend in case he needs further narcotics before the weekend.  He can use ice or heat over the affected area.  Incentive spirometer is provided to hopefully prevent pneumonia or atelectasis.  Reasons to return to the emergency room were discussed  I have reviewed the nursing notes.    I have reviewed the findings, diagnosis, plan and need for follow up with the patient.       Discharge Medication List as of 4/25/2018  8:19 PM       START taking these medications    Details   gabapentin (NEURONTIN) 300 MG capsule Take 1 capsule (300 mg) by mouth 3 times daily, Disp-60 capsule, R-0, Local Print      !! oxyCODONE-acetaminophen (PERCOCET) 5-325 MG per tablet Take 1-2 tablets by mouth every 4 hours as needed for severe pain, Disp-12 tablet, R-0, Local Print       !! - Potential duplicate medications found. Please discuss with provider.          Final diagnoses:   Fall down stairs, initial encounter   Closed fracture of multiple ribs of right side, initial encounter       4/25/2018   [facility] EMERGENCY DEPARTMENT     Tomas Saldivar MD  04/25/18 2035       Tomas Saldivar MD  04/25/18 [id number]

## (undated) DEVICE — BLADE KNIFE SURG 10 371110

## (undated) DEVICE — ADHESIVE SWIFTSET 0.8ML OCTYL SS6

## (undated) DEVICE — ENDO TROCAR OPTICAL 05MM VERSAPORT PLUS W/FIX CAN ONB5STF

## (undated) DEVICE — SUCTION IRR STRYKERFLOW II W/TIP 250-070-520

## (undated) DEVICE — STOCKING SLEEVE COMPRESSION CALF LG

## (undated) DEVICE — SU MONOCRYL 4-0 PS-2 18" UND Y496G

## (undated) DEVICE — Device

## (undated) DEVICE — ENDO TROCAR BLUNT 100MM W/THRD ANCHOR BLUNTPORT BPT12STS

## (undated) DEVICE — SU VICRYL 1 CT-1 CR 8X18" J741D

## (undated) DEVICE — SOL NACL 0.9% INJ 1000ML BAG 07983-09

## (undated) DEVICE — SPONGE LAP 18X18" X8435

## (undated) DEVICE — GLOVE PROTEXIS W/NEU-THERA 8.0  2D73TE80

## (undated) DEVICE — ESU HOLSTER PLASTIC DISP E2400

## (undated) DEVICE — GOWN XLG DISP 9545

## (undated) DEVICE — DRSG PRIMAPORE 04X11 3/4"

## (undated) DEVICE — DECANTER VIAL 2006S

## (undated) DEVICE — ENDO POUCH GOLD 10MM ECATCH 173050G

## (undated) DEVICE — ESU PENCIL W/COATED BLADE E2450H

## (undated) DEVICE — PREP CHLORAPREP 26ML TINTED ORANGE  260815

## (undated) DEVICE — DRAPE POUCH INSTRUMENT 3 POCKET 1018L

## (undated) DEVICE — ESU LIGASURE MARYLAND JAW OPEN SEALER/DVDR 5MMX37CM LF1737

## (undated) DEVICE — BLADE CLIPPER 4406

## (undated) DEVICE — SOL WATER IRRIG 1000ML BOTTLE 07139-09

## (undated) DEVICE — SOL NACL 0.9% IRRIG 1000ML BOTTLE 07138-09

## (undated) DEVICE — SU VICRYL 0 CT-2 27" J334H

## (undated) DEVICE — ENDO CANNULA FIXATION OPTICAL 05MM VERSAPORT PLUS ONBFCA5ST

## (undated) DEVICE — STPL SKIN 35W 059037

## (undated) DEVICE — SUCTION TIP POOLE K770

## (undated) DEVICE — STPL ENDO GIA 30X2.5MM 030811

## (undated) RX ORDER — PROPOFOL 10 MG/ML
INJECTION, EMULSION INTRAVENOUS
Status: DISPENSED
Start: 2017-04-11

## (undated) RX ORDER — NEOSTIGMINE METHYLSULFATE 5 MG/5 ML
SYRINGE (ML) INTRAVENOUS
Status: DISPENSED
Start: 2017-04-11

## (undated) RX ORDER — MEPERIDINE HYDROCHLORIDE 50 MG/ML
INJECTION INTRAMUSCULAR; INTRAVENOUS; SUBCUTANEOUS
Status: DISPENSED
Start: 2017-04-11

## (undated) RX ORDER — FENTANYL CITRATE 50 UG/ML
INJECTION, SOLUTION INTRAMUSCULAR; INTRAVENOUS
Status: DISPENSED
Start: 2017-04-11

## (undated) RX ORDER — LIDOCAINE HYDROCHLORIDE 10 MG/ML
INJECTION, SOLUTION EPIDURAL; INFILTRATION; INTRACAUDAL; PERINEURAL
Status: DISPENSED
Start: 2017-04-11

## (undated) RX ORDER — ONDANSETRON 2 MG/ML
INJECTION INTRAMUSCULAR; INTRAVENOUS
Status: DISPENSED
Start: 2017-04-11

## (undated) RX ORDER — GLYCOPYRROLATE 0.2 MG/ML
INJECTION, SOLUTION INTRAMUSCULAR; INTRAVENOUS
Status: DISPENSED
Start: 2017-04-11

## (undated) RX ORDER — DEXAMETHASONE SODIUM PHOSPHATE 4 MG/ML
INJECTION, SOLUTION INTRA-ARTICULAR; INTRALESIONAL; INTRAMUSCULAR; INTRAVENOUS; SOFT TISSUE
Status: DISPENSED
Start: 2017-04-11